# Patient Record
Sex: MALE | Race: WHITE | NOT HISPANIC OR LATINO | URBAN - METROPOLITAN AREA
[De-identification: names, ages, dates, MRNs, and addresses within clinical notes are randomized per-mention and may not be internally consistent; named-entity substitution may affect disease eponyms.]

---

## 2017-03-12 ENCOUNTER — EMERGENCY (EMERGENCY)
Facility: HOSPITAL | Age: 52
LOS: 1 days | Discharge: HOME | End: 2017-03-12
Attending: EMERGENCY MEDICINE | Admitting: EMERGENCY MEDICINE
Payer: COMMERCIAL

## 2017-03-12 VITALS
SYSTOLIC BLOOD PRESSURE: 146 MMHG | HEIGHT: 70 IN | RESPIRATION RATE: 19 BRPM | HEART RATE: 83 BPM | DIASTOLIC BLOOD PRESSURE: 78 MMHG | WEIGHT: 177.91 LBS | TEMPERATURE: 98 F | OXYGEN SATURATION: 98 %

## 2017-03-12 DIAGNOSIS — E78.5 HYPERLIPIDEMIA, UNSPECIFIED: ICD-10-CM

## 2017-03-12 DIAGNOSIS — Z88.8 ALLERGY STATUS TO OTHER DRUGS, MEDICAMENTS AND BIOLOGICAL SUBSTANCES STATUS: ICD-10-CM

## 2017-03-12 DIAGNOSIS — R10.30 LOWER ABDOMINAL PAIN, UNSPECIFIED: ICD-10-CM

## 2017-03-12 DIAGNOSIS — K59.00 CONSTIPATION, UNSPECIFIED: ICD-10-CM

## 2017-03-12 DIAGNOSIS — E78.5 HYPERLIPIDEMIA, UNSPECIFIED: Chronic | ICD-10-CM

## 2017-03-12 LAB
ALBUMIN SERPL ELPH-MCNC: 4.9 G/DL — SIGNIFICANT CHANGE UP (ref 3.3–5)
ALP SERPL-CCNC: 68 U/L — SIGNIFICANT CHANGE UP (ref 40–120)
ALT FLD-CCNC: 24 U/L RC — SIGNIFICANT CHANGE UP (ref 10–45)
ANION GAP SERPL CALC-SCNC: 17 MMOL/L — SIGNIFICANT CHANGE UP (ref 5–17)
AST SERPL-CCNC: 28 U/L — SIGNIFICANT CHANGE UP (ref 10–40)
BASOPHILS # BLD AUTO: 0.1 K/UL — SIGNIFICANT CHANGE UP (ref 0–0.2)
BASOPHILS NFR BLD AUTO: 1.1 % — SIGNIFICANT CHANGE UP (ref 0–2)
BILIRUB SERPL-MCNC: 1 MG/DL — SIGNIFICANT CHANGE UP (ref 0.2–1.2)
BUN SERPL-MCNC: 15 MG/DL — SIGNIFICANT CHANGE UP (ref 7–23)
CALCIUM SERPL-MCNC: 9.9 MG/DL — SIGNIFICANT CHANGE UP (ref 8.4–10.5)
CHLORIDE SERPL-SCNC: 101 MMOL/L — SIGNIFICANT CHANGE UP (ref 96–108)
CO2 SERPL-SCNC: 23 MMOL/L — SIGNIFICANT CHANGE UP (ref 22–31)
CREAT SERPL-MCNC: 0.87 MG/DL — SIGNIFICANT CHANGE UP (ref 0.5–1.3)
EOSINOPHIL # BLD AUTO: 0.1 K/UL — SIGNIFICANT CHANGE UP (ref 0–0.5)
EOSINOPHIL NFR BLD AUTO: 0.8 % — SIGNIFICANT CHANGE UP (ref 0–6)
GLUCOSE SERPL-MCNC: 107 MG/DL — HIGH (ref 70–99)
HCT VFR BLD CALC: 45.7 % — SIGNIFICANT CHANGE UP (ref 39–50)
HGB BLD-MCNC: 14.8 G/DL — SIGNIFICANT CHANGE UP (ref 13–17)
LIDOCAIN IGE QN: 22 U/L — SIGNIFICANT CHANGE UP (ref 7–60)
LYMPHOCYTES # BLD AUTO: 1.9 K/UL — SIGNIFICANT CHANGE UP (ref 1–3.3)
LYMPHOCYTES # BLD AUTO: 28.8 % — SIGNIFICANT CHANGE UP (ref 13–44)
MCHC RBC-ENTMCNC: 30.5 PG — SIGNIFICANT CHANGE UP (ref 27–34)
MCHC RBC-ENTMCNC: 32.5 GM/DL — SIGNIFICANT CHANGE UP (ref 32–36)
MCV RBC AUTO: 94 FL — SIGNIFICANT CHANGE UP (ref 80–100)
MONOCYTES # BLD AUTO: 0.5 K/UL — SIGNIFICANT CHANGE UP (ref 0–0.9)
MONOCYTES NFR BLD AUTO: 7.4 % — SIGNIFICANT CHANGE UP (ref 2–14)
NEUTROPHILS # BLD AUTO: 4.1 K/UL — SIGNIFICANT CHANGE UP (ref 1.8–7.4)
NEUTROPHILS NFR BLD AUTO: 62 % — SIGNIFICANT CHANGE UP (ref 43–77)
PLATELET # BLD AUTO: 255 K/UL — SIGNIFICANT CHANGE UP (ref 150–400)
POTASSIUM SERPL-MCNC: 4.4 MMOL/L — SIGNIFICANT CHANGE UP (ref 3.5–5.3)
POTASSIUM SERPL-SCNC: 4.4 MMOL/L — SIGNIFICANT CHANGE UP (ref 3.5–5.3)
PROT SERPL-MCNC: 7.6 G/DL — SIGNIFICANT CHANGE UP (ref 6–8.3)
RBC # BLD: 4.86 M/UL — SIGNIFICANT CHANGE UP (ref 4.2–5.8)
RBC # FLD: 10.4 % — SIGNIFICANT CHANGE UP (ref 10.3–14.5)
SODIUM SERPL-SCNC: 141 MMOL/L — SIGNIFICANT CHANGE UP (ref 135–145)
WBC # BLD: 6.6 K/UL — SIGNIFICANT CHANGE UP (ref 3.8–10.5)
WBC # FLD AUTO: 6.6 K/UL — SIGNIFICANT CHANGE UP (ref 3.8–10.5)

## 2017-03-12 PROCEDURE — 99283 EMERGENCY DEPT VISIT LOW MDM: CPT | Mod: 25

## 2017-03-12 PROCEDURE — 85027 COMPLETE CBC AUTOMATED: CPT

## 2017-03-12 PROCEDURE — 93010 ELECTROCARDIOGRAM REPORT: CPT | Mod: NC

## 2017-03-12 PROCEDURE — 93005 ELECTROCARDIOGRAM TRACING: CPT

## 2017-03-12 PROCEDURE — 99284 EMERGENCY DEPT VISIT MOD MDM: CPT | Mod: 25

## 2017-03-12 PROCEDURE — 80053 COMPREHEN METABOLIC PANEL: CPT

## 2017-03-12 PROCEDURE — 83690 ASSAY OF LIPASE: CPT

## 2017-03-12 RX ORDER — POLYETHYLENE GLYCOL 3350 17 G/17G
17 POWDER, FOR SOLUTION ORAL ONCE
Qty: 0 | Refills: 0 | Status: COMPLETED | OUTPATIENT
Start: 2017-03-12 | End: 2017-03-12

## 2017-03-12 RX ADMIN — POLYETHYLENE GLYCOL 3350 17 GRAM(S): 17 POWDER, FOR SOLUTION ORAL at 13:44

## 2017-03-12 NOTE — ED ADULT TRIAGE NOTE - CHIEF COMPLAINT QUOTE
constipated, distention feelings s/p eating sandwich last week. pt states he went to urgent care and was given meds which made him nauseous. pt had BM today, states feels relief from discomfort

## 2017-03-12 NOTE — ED ADULT NURSE NOTE - OBJECTIVE STATEMENT
51 y/o M presents to the ED c/o of gas, bloating, constipation and nausea.  Patient states he felt constipated last week, Patient had been taking over the counter stool softens for a week.  Patient went to a first med yesterday and was prescribed Bentyl, but now feels nauseous.  Last BM today and was soft.  No blood in stool.  Patient has mild nausea now.  Patient is A&Ox4. Face is symmetrical. PERRL 3mmB. Speech is clear. No chest pain, shortness of breath, diaphoresis, palpitations.  Abdomen is soft, nondistended, nontender to palpation.

## 2017-03-12 NOTE — ED PROVIDER NOTE - MEDICAL DECISION MAKING DETAILS
benign abdominal exam, no indication to ct right now - lenghty discussion regarding treatment and follow up - agrees to go to GI for colonoscopy this week - has his own GI. po challenge and miralax -marcel

## 2017-03-12 NOTE — ED PROVIDER NOTE - PHYSICAL EXAMINATION
Gen: well appearing, of stated age, no acute distress; Head: NC, AT; ENT: MMM, no uvular deviation; Neck: supple with full ROM; Chest: CTAB, no retractions, rate normal, appears to breath comfortable; Heart: RRR S1S2 No JVD No peripheral edema b/l pulses 2+ in arms and legs; Abd: Soft non-tender, no rebound or guarding, no masses, no brown sign, no mcburney tenderness, no CVAT; Back: No spinal deformity; Ext: Moving all 4 limbs without obvious impairment to ROM, no obvious weakness; Neuro: fluid speech, no focal deficits; Psych: No anxiety, depression or pressured speech noted; Skin: no utricaria, no diffuse rash. -ncohen

## 2017-03-12 NOTE — ED PROVIDER NOTE - ATTENDING CONTRIBUTION TO CARE
I have seen and evaluated this patient with the resident.   I agree with the findings  unless other wise stated.  I have made appropriate changes in documentations where needed, After my face to face bedside evaluation, I am further  noting: benign abdominal exam, no indication to ct right now - lenghty discussion regarding treatment and follow up - agrees to go to GI for colonoscopy this week - has his own GI. po challenge and miralax hydration

## 2017-03-12 NOTE — ED PROVIDER NOTE - OBJECTIVE STATEMENT
52 year old male otherwise healthy presenting with constipation since wednesday. States that he ate an egg sandwhich / keenan sandwhich and had abdominal crapms then and decreased bm since then, only had 2 bm since wednesday, usually daily. No abdominal symptoms now. States that he regurgitated while eating but has had no problem eating since. Never had colonoscopy. No fever, no injury, no tarry stool, no bloody stool - prune juice w bm today.

## 2019-11-18 NOTE — ED ADULT TRIAGE NOTE - AS O2 DELIVERY
Quality 265: Biopsy Follow-Up: Biopsy results reviewed, communicated, tracked, and documented
room air
Quality 358: Patient-Centered Surgical Risk Assessment And Communication: Documentation of patient-specific risk assessment with a risk calculator based on multi-institutional clinical data, the specific risk calculator used, and communication of risk assessment from risk calculator with the patient or family.
Quality 431: Preventive Care And Screening: Unhealthy Alcohol Use - Screening: Patient screened for unhealthy alcohol use using a single question and scores less than 2 times per year
Detail Level: Detailed
Quality 110: Preventive Care And Screening: Influenza Immunization: Influenza Immunization Administered during Influenza season
Quality 226: Preventive Care And Screening: Tobacco Use: Screening And Cessation Intervention: Patient screened for tobacco and never smoked
Quality 400a: One-Time Screening For Hepatitis C Virus (Hcv) For All Patients: Patient received one-time screening for HCV infection

## 2023-08-26 ENCOUNTER — INPATIENT (INPATIENT)
Facility: HOSPITAL | Age: 58
LOS: 2 days | Discharge: HOME | DRG: 66 | End: 2023-08-29
Attending: PSYCHIATRY & NEUROLOGY | Admitting: EMERGENCY MEDICINE
Payer: COMMERCIAL

## 2023-08-26 VITALS
WEIGHT: 164.91 LBS | OXYGEN SATURATION: 96 % | SYSTOLIC BLOOD PRESSURE: 125 MMHG | HEIGHT: 68 IN | RESPIRATION RATE: 22 BRPM | HEART RATE: 70 BPM | DIASTOLIC BLOOD PRESSURE: 76 MMHG | TEMPERATURE: 98 F

## 2023-08-26 LAB
ALBUMIN SERPL ELPH-MCNC: 4.2 G/DL — SIGNIFICANT CHANGE UP (ref 3.3–5)
ALP SERPL-CCNC: 60 U/L — SIGNIFICANT CHANGE UP (ref 40–120)
ALT FLD-CCNC: 18 U/L — SIGNIFICANT CHANGE UP (ref 10–45)
ANION GAP SERPL CALC-SCNC: 13 MMOL/L — SIGNIFICANT CHANGE UP (ref 5–17)
AST SERPL-CCNC: 22 U/L — SIGNIFICANT CHANGE UP (ref 10–40)
BASOPHILS # BLD AUTO: 0.07 K/UL — SIGNIFICANT CHANGE UP (ref 0–0.2)
BASOPHILS NFR BLD AUTO: 0.8 % — SIGNIFICANT CHANGE UP (ref 0–2)
BILIRUB SERPL-MCNC: 0.9 MG/DL — SIGNIFICANT CHANGE UP (ref 0.2–1.2)
BUN SERPL-MCNC: 21 MG/DL — SIGNIFICANT CHANGE UP (ref 7–23)
CALCIUM SERPL-MCNC: 8.9 MG/DL — SIGNIFICANT CHANGE UP (ref 8.4–10.5)
CHLORIDE SERPL-SCNC: 104 MMOL/L — SIGNIFICANT CHANGE UP (ref 96–108)
CO2 SERPL-SCNC: 24 MMOL/L — SIGNIFICANT CHANGE UP (ref 22–31)
CREAT SERPL-MCNC: 0.87 MG/DL — SIGNIFICANT CHANGE UP (ref 0.5–1.3)
EGFR: 99 ML/MIN/1.73M2 — SIGNIFICANT CHANGE UP
EOSINOPHIL # BLD AUTO: 0.04 K/UL — SIGNIFICANT CHANGE UP (ref 0–0.5)
EOSINOPHIL NFR BLD AUTO: 0.4 % — SIGNIFICANT CHANGE UP (ref 0–6)
GLUCOSE SERPL-MCNC: 143 MG/DL — HIGH (ref 70–99)
HCT VFR BLD CALC: 38 % — LOW (ref 39–50)
HGB BLD-MCNC: 12.5 G/DL — LOW (ref 13–17)
IMM GRANULOCYTES NFR BLD AUTO: 0.3 % — SIGNIFICANT CHANGE UP (ref 0–0.9)
LYMPHOCYTES # BLD AUTO: 2.27 K/UL — SIGNIFICANT CHANGE UP (ref 1–3.3)
LYMPHOCYTES # BLD AUTO: 25.2 % — SIGNIFICANT CHANGE UP (ref 13–44)
MAGNESIUM SERPL-MCNC: 2.1 MG/DL — SIGNIFICANT CHANGE UP (ref 1.6–2.6)
MCHC RBC-ENTMCNC: 30.7 PG — SIGNIFICANT CHANGE UP (ref 27–34)
MCHC RBC-ENTMCNC: 32.9 GM/DL — SIGNIFICANT CHANGE UP (ref 32–36)
MCV RBC AUTO: 93.4 FL — SIGNIFICANT CHANGE UP (ref 80–100)
MONOCYTES # BLD AUTO: 0.54 K/UL — SIGNIFICANT CHANGE UP (ref 0–0.9)
MONOCYTES NFR BLD AUTO: 6 % — SIGNIFICANT CHANGE UP (ref 2–14)
NEUTROPHILS # BLD AUTO: 6.07 K/UL — SIGNIFICANT CHANGE UP (ref 1.8–7.4)
NEUTROPHILS NFR BLD AUTO: 67.3 % — SIGNIFICANT CHANGE UP (ref 43–77)
NRBC # BLD: 0 /100 WBCS — SIGNIFICANT CHANGE UP (ref 0–0)
PHOSPHATE SERPL-MCNC: 2.3 MG/DL — LOW (ref 2.5–4.5)
PLATELET # BLD AUTO: 273 K/UL — SIGNIFICANT CHANGE UP (ref 150–400)
POTASSIUM SERPL-MCNC: 3.9 MMOL/L — SIGNIFICANT CHANGE UP (ref 3.5–5.3)
POTASSIUM SERPL-SCNC: 3.9 MMOL/L — SIGNIFICANT CHANGE UP (ref 3.5–5.3)
PROT SERPL-MCNC: 6.4 G/DL — SIGNIFICANT CHANGE UP (ref 6–8.3)
RBC # BLD: 4.07 M/UL — LOW (ref 4.2–5.8)
RBC # FLD: 11.6 % — SIGNIFICANT CHANGE UP (ref 10.3–14.5)
SODIUM SERPL-SCNC: 141 MMOL/L — SIGNIFICANT CHANGE UP (ref 135–145)
WBC # BLD: 9.02 K/UL — SIGNIFICANT CHANGE UP (ref 3.8–10.5)
WBC # FLD AUTO: 9.02 K/UL — SIGNIFICANT CHANGE UP (ref 3.8–10.5)

## 2023-08-26 PROCEDURE — 70450 CT HEAD/BRAIN W/O DYE: CPT | Mod: 26,59,MA

## 2023-08-26 PROCEDURE — 70496 CT ANGIOGRAPHY HEAD: CPT | Mod: 26,MA

## 2023-08-26 PROCEDURE — 99285 EMERGENCY DEPT VISIT HI MDM: CPT

## 2023-08-26 PROCEDURE — 70498 CT ANGIOGRAPHY NECK: CPT | Mod: 26,QQ

## 2023-08-26 RX ORDER — METOCLOPRAMIDE HCL 10 MG
10 TABLET ORAL ONCE
Refills: 0 | Status: COMPLETED | OUTPATIENT
Start: 2023-08-26 | End: 2023-08-26

## 2023-08-26 RX ORDER — MECLIZINE HCL 12.5 MG
50 TABLET ORAL ONCE
Refills: 0 | Status: COMPLETED | OUTPATIENT
Start: 2023-08-26 | End: 2023-08-26

## 2023-08-26 RX ORDER — ONDANSETRON 8 MG/1
4 TABLET, FILM COATED ORAL ONCE
Refills: 0 | Status: COMPLETED | OUTPATIENT
Start: 2023-08-26 | End: 2023-08-26

## 2023-08-26 RX ORDER — SODIUM CHLORIDE 9 MG/ML
2000 INJECTION, SOLUTION INTRAVENOUS ONCE
Refills: 0 | Status: COMPLETED | OUTPATIENT
Start: 2023-08-26 | End: 2023-08-26

## 2023-08-26 RX ADMIN — SODIUM CHLORIDE 2000 MILLILITER(S): 9 INJECTION, SOLUTION INTRAVENOUS at 19:32

## 2023-08-26 RX ADMIN — Medication 50 MILLIGRAM(S): at 20:20

## 2023-08-26 RX ADMIN — Medication 10 MILLIGRAM(S): at 21:21

## 2023-08-26 RX ADMIN — ONDANSETRON 4 MILLIGRAM(S): 8 TABLET, FILM COATED ORAL at 19:30

## 2023-08-26 NOTE — ED ADULT NURSE NOTE - NSFALLOOBATTEMPT_ED_ALL_ED
THE [facility] in [facility] [address] [address]    Phone:  [phone]    Fax:  Jeannine Brandt 24   MRN: [id number]    Department:  THE [facility] in [place]   Date of Visit:  2/23/2017           Diag To Check ER Wait Times:  TEXT 'ERwait' to 22403      Click [email or web address]      Or call [phone]    If you have any problems with your follow-up, please call our  at [phone].     Si usted tiene algun problema con kaminski sequimiento, por I have read and understand the instructions given to me by my caregivers. 24-Hour Pharmacies        Pharmacy Address Phone Number   [facility] [address] [facility] ([address]) [address].  [facility] & CONCLUSION:  Peribronchial wall thickening which may represent a viral etiology versus reactive airways disease           Dictated by: Julieta Boyd MD on 2/23/2017 at 10:11       Approved by: Julieta Boyd MD              Narrative:    PROCEDURE:  XR No

## 2023-08-26 NOTE — ED ADULT NURSE NOTE - NSFALLUNIVINTERV_ED_ALL_ED
Bed/Stretcher in lowest position, wheels locked, appropriate side rails in place/Call bell, personal items and telephone in reach/Instruct patient to call for assistance before getting out of bed/chair/stretcher/Non-slip footwear applied when patient is off stretcher/[place] to call system/Physically safe environment - no spills, clutter or unnecessary equipment/Purposeful proactive rounding/Room/bathroom lighting operational, light cord in reach

## 2023-08-26 NOTE — ED ADULT NURSE NOTE - OBJECTIVE STATEMENT
58y M PMH HLD bibEMS from home c/o vomiting. EMS reports pt was at home and ate a hamburger for dinner when approx 20minutes later became dizzy and started vomiting. No hx of vertigo. Pt appears very uncomfortable, not currently vomiting. Denies cp, fevers, chills, sob, diarrhea, abd pain, urinary symptoms. VS documented. EKG done. MD Estrada at bedside. Comfort and safety maintained.

## 2023-08-26 NOTE — ED PROVIDER NOTE - PROGRESS NOTE DETAILS
Dr. Carr:  Pt slightly improved. No longer nauseated or vomiting, however still quite ataxic. Will give addtl meds and image. Eva PGY3: patient reassessed. Reports symptomatic improvement. However still significantly ataxic. Neurology consult placed. Neurology recommends CDU observation for MRI.

## 2023-08-26 NOTE — ED PROVIDER NOTE - CLINICAL SUMMARY MEDICAL DECISION MAKING FREE TEXT BOX
58M hx HLD here with acute onset dizziness assoc w NV onset this evening. Sx worse w head mvmt, change in position. No HA or vision change, No recent illness, No head trauma. No CP. No weakness, numbness, tingling. Unsteady gait. VS WNL. PERRL EOMI. CN intact. Motor 5/5 throughout, SILT. Ataxic. Suspect BPPV. Less likely central etiology or arrhthymias. Check labs, EKG, meds, re-eval.

## 2023-08-26 NOTE — ED PROVIDER NOTE - OBJECTIVE STATEMENT
58-year-old male past medical history of hyperlipidemia presents the ED complaining of several episodes of nausea and vomiting after sudden onset room spinning dizziness while eating dinner earlier tonight.  Patient states that dizziness is worse when he is moving his head to the left.  Denies history of vertigo, chest pain, shortness of breath, lightheadedness, fever, abdominal pain, dysuria, diarrhea.

## 2023-08-26 NOTE — ED PROVIDER NOTE - PHYSICAL EXAMINATION
GEN: Patient awake alert NAD.   HEENT: normocephalic, atraumatic, EOMI, no nystagmus, no scleral icterus, moist MM  CARDIAC: RRR, S1, S2, no murmur.   PULM: CTA B/L no wheeze, rhonchi, rales.   ABD: soft NT, ND, no rebound no guarding  MSK: Moving all extremities, no edema. 5/5 strength and full ROM in all extremities.     NEURO: A&Ox3, gait ataxia, no focal neurological deficits, CN 2-12 grossly intact, No dysmetria, pronator drift  SKIN: warm, dry, no rash.

## 2023-08-27 DIAGNOSIS — R42 DIZZINESS AND GIDDINESS: ICD-10-CM

## 2023-08-27 LAB
A1C WITH ESTIMATED AVERAGE GLUCOSE RESULT: 5 % — SIGNIFICANT CHANGE UP (ref 4–5.6)
AMPHET UR-MCNC: NEGATIVE — SIGNIFICANT CHANGE UP
BARBITURATES UR SCN-MCNC: NEGATIVE — SIGNIFICANT CHANGE UP
BENZODIAZ UR-MCNC: NEGATIVE — SIGNIFICANT CHANGE UP
CHOLEST SERPL-MCNC: 183 MG/DL — SIGNIFICANT CHANGE UP
COCAINE METAB.OTHER UR-MCNC: NEGATIVE — SIGNIFICANT CHANGE UP
ESTIMATED AVERAGE GLUCOSE: 97 MG/DL — SIGNIFICANT CHANGE UP (ref 68–114)
HDLC SERPL-MCNC: 60 MG/DL — SIGNIFICANT CHANGE UP
LIPID PNL WITH DIRECT LDL SERPL: 115 MG/DL — HIGH
METHADONE UR-MCNC: NEGATIVE — SIGNIFICANT CHANGE UP
NON HDL CHOLESTEROL: 124 MG/DL — SIGNIFICANT CHANGE UP
OPIATES UR-MCNC: NEGATIVE — SIGNIFICANT CHANGE UP
OXYCODONE UR-MCNC: NEGATIVE — SIGNIFICANT CHANGE UP
PCP SPEC-MCNC: SIGNIFICANT CHANGE UP
PCP UR-MCNC: NEGATIVE — SIGNIFICANT CHANGE UP
THC UR QL: NEGATIVE — SIGNIFICANT CHANGE UP
TRIGL SERPL-MCNC: 44 MG/DL — SIGNIFICANT CHANGE UP

## 2023-08-27 PROCEDURE — 99223 1ST HOSP IP/OBS HIGH 75: CPT

## 2023-08-27 PROCEDURE — 70551 MRI BRAIN STEM W/O DYE: CPT | Mod: 26,MA

## 2023-08-27 PROCEDURE — 99236 HOSP IP/OBS SAME DATE HI 85: CPT

## 2023-08-27 RX ORDER — SODIUM,POTASSIUM PHOSPHATES 278-250MG
1 POWDER IN PACKET (EA) ORAL ONCE
Refills: 0 | Status: COMPLETED | OUTPATIENT
Start: 2023-08-27 | End: 2023-08-27

## 2023-08-27 RX ORDER — MECLIZINE HCL 12.5 MG
25 TABLET ORAL EVERY 6 HOURS
Refills: 0 | Status: DISCONTINUED | OUTPATIENT
Start: 2023-08-27 | End: 2023-08-29

## 2023-08-27 RX ORDER — CLOPIDOGREL BISULFATE 75 MG/1
75 TABLET, FILM COATED ORAL DAILY
Refills: 0 | Status: DISCONTINUED | OUTPATIENT
Start: 2023-08-27 | End: 2023-08-29

## 2023-08-27 RX ORDER — ASPIRIN/CALCIUM CARB/MAGNESIUM 324 MG
81 TABLET ORAL DAILY
Refills: 0 | Status: DISCONTINUED | OUTPATIENT
Start: 2023-08-27 | End: 2023-08-29

## 2023-08-27 RX ORDER — ACETAMINOPHEN 500 MG
650 TABLET ORAL EVERY 6 HOURS
Refills: 0 | Status: DISCONTINUED | OUTPATIENT
Start: 2023-08-27 | End: 2023-08-29

## 2023-08-27 RX ORDER — ENOXAPARIN SODIUM 100 MG/ML
40 INJECTION SUBCUTANEOUS EVERY 24 HOURS
Refills: 0 | Status: DISCONTINUED | OUTPATIENT
Start: 2023-08-27 | End: 2023-08-29

## 2023-08-27 RX ORDER — FAMOTIDINE 10 MG/ML
40 INJECTION INTRAVENOUS EVERY 12 HOURS
Refills: 0 | Status: DISCONTINUED | OUTPATIENT
Start: 2023-08-27 | End: 2023-08-29

## 2023-08-27 RX ORDER — SIMVASTATIN 20 MG/1
1 TABLET, FILM COATED ORAL
Qty: 0 | Refills: 0 | DISCHARGE

## 2023-08-27 RX ORDER — ATORVASTATIN CALCIUM 80 MG/1
80 TABLET, FILM COATED ORAL AT BEDTIME
Refills: 0 | Status: DISCONTINUED | OUTPATIENT
Start: 2023-08-27 | End: 2023-08-29

## 2023-08-27 RX ADMIN — Medication 25 MILLIGRAM(S): at 14:32

## 2023-08-27 RX ADMIN — Medication 650 MILLIGRAM(S): at 17:32

## 2023-08-27 RX ADMIN — ENOXAPARIN SODIUM 40 MILLIGRAM(S): 100 INJECTION SUBCUTANEOUS at 15:11

## 2023-08-27 RX ADMIN — Medication 1 TABLET(S): at 15:16

## 2023-08-27 RX ADMIN — CLOPIDOGREL BISULFATE 75 MILLIGRAM(S): 75 TABLET, FILM COATED ORAL at 15:11

## 2023-08-27 RX ADMIN — Medication 81 MILLIGRAM(S): at 15:11

## 2023-08-27 RX ADMIN — Medication 650 MILLIGRAM(S): at 18:25

## 2023-08-27 RX ADMIN — ATORVASTATIN CALCIUM 80 MILLIGRAM(S): 80 TABLET, FILM COATED ORAL at 21:16

## 2023-08-27 NOTE — ED CDU PROVIDER DISPOSITION NOTE - NSFOLLOWUPCLINICS_GEN_ALL_ED_FT
[facility]  Neurology Auto[facility] En[facility]itis  [address] [place] [address]  Phone: [phone]  Fax: [phone]

## 2023-08-27 NOTE — CONSULT NOTE ADULT - ASSESSMENT
Impression: Vertigo likely peripheral (BPPV). However, given history LUE dysmetria, vascular comorbidities, and pt still having difficulty walking (no walking issues at baseline), would be prudent to r/o ischemic stroke of brainstem/cerebellum that could cause vertigo.    Recommendations:  [ ] CDU for MRI w/o contrast to evaluate for ischemic stroke. Further recs pending results.  [ ] Meclizine for symptom control    Case discussed with neuro attending Dr. Gerardo.  JIMW 5 PM 8/26/2023  NIHSS 1  preMRS 0  Pt not IV tenecteplase candidate because outside of time window  Pt not thrombectomy candidate because no LVO    Impression: Vertigo likely peripheral (BPPV). However, given history LUE dysmetria, vascular comorbidities, and pt still having difficulty walking (no walking issues at baseline), would be prudent to r/o ischemic stroke of brainstem/cerebellum that could cause vertigo.    Recommendations:  [ ] CDU for MRI w/o contrast to evaluate for ischemic stroke. Further recs pending results.  [ ] Meclizine for symptom control    Case discussed with neuro attending Dr. Gerardo.

## 2023-08-27 NOTE — CONSULT NOTE ADULT - ATTENDING COMMENTS
HPI outlined above.   Patient with acute onset vertigo yesterday while watching TV associated with episode of vomiting. Positional changes trigger vertigo but has new gait unsteadiness. Denies bulbar symptoms or focal motor weakness.     On exam: Alert, follows commands well. No aphasia  EOMI, VFF V1-V3 intact. Face symmetric  Strength 5/5 throughout  Sensation: intact LT b/l  DTRs 2+ b/l  Passpoints on Left FNF and has Left HTS ataxia  Stands with wide base of station and is unsteady when taking a step. Cannot tandem    CTH: no acute pathology    Imp: acute onset vertigo with evidence of left hemiataxia; r/o stroke  - Needs MRI brain  - will f/u after

## 2023-08-27 NOTE — H&P ADULT - NSHPLABSRESULTS_GEN_ALL_CORE
12.5   9.02  )-----------( 273      ( 26 Aug 2023 19:30 )             38.0     141  |  104  |  21  ----------------------------<  143<H>  3.9   |  24  |  0.87  Ca    8.9      26 Aug 2023 19:30  Phos  2.3     08-26  Mg     2.1     08-26  TPro  6.4  /  Alb  4.2  /  TBili  0.9  /  DBili  x   /  AST  22  /  ALT  18  /  AlkPhos  60  08-26  Urinalysis Basic - ( 26 Aug 2023 19:30 )  Color: x / Appearance: x / SG: x / pH: x  Gluc: 143 mg/dL / Ketone: x  / Bili: x / Urobili: x   Blood: x / Protein: x / Nitrite: x   Leuk Esterase: x / RBC: x / WBC x   Sq Epi: x / Non Sq Epi: x / Bacteria: x  CAPILLARY BLOOD GLUCOSE      < from: CT Angio Head w/ IV Cont (08.26.23 @ 23:27) >    IMPRESSION:    CT HEAD: Negative noncontrast head CT.    CTA HEAD:  No flow-limiting stenosis or occlusion. 2 mm anterior communicating   artery aneurysm.    CTA NECK:  No flow-limiting stenosis, occlusion or dissection.    < end of copied text >    < from: MR Head No Cont (08.27.23 @ 11:22) >      Comparison is made with the prior CT/CTA of 8/26/2023.    The fourth, third and lateral ventriclesare normal in size and position.   There is no hemorrhage, mass or shift of the midline structures. There is   an acute infarct in the left cerebellar hemisphere in between the border   zone of the left posterior inferior and anterior inferior cerebellar   arteries. No acute hemorrhage is identified.      The sellar and parasellar structures are unremarkable. The paranasal   sinuses are clear.    IMPRESSION: Acute left cerebellar infarct.    < end of copied text >

## 2023-08-27 NOTE — ED CDU PROVIDER INITIAL DAY NOTE - OBJECTIVE STATEMENT
58-year-old male past medical history of hyperlipidemia presents the ED complaining of several episodes of nausea and vomiting after sudden onset room spinning dizziness while eating dinner earlier tonight.  Patient states that dizziness is worse when he is moving his head to the left.  Denies history of vertigo, chest pain, shortness of breath, lightheadedness, fever, abdominal pain, dysuria, diarrhea.  In ED, labs nonactionable, no infarct or ischemic dz on CT/CTA head and neck, pt persistently dizzy - neuro consulted rec mri brain w/o. CDU for mri and neuro following when CDU bed becomes available.

## 2023-08-27 NOTE — H&P ADULT - ASSESSMENT
note incomplete    admit to stroke unit 58M HLD, p/w acute onset of vertigo with nausea/vomiting and gait instability. CTH neg. CTA with 2mm acom aneurysm. MR brain with cute infarct in the left cerebellar hemisphere in between the border zone of the left posterior inferior and anterior inferior cerebellar arteries.    Impression: Vertigo, dysmetria, ataxia due to cerebellar dysfunction due to acute ischemic stroke in cerebellum likely due to small vessel disease    Last known normal 1600 8/26/23,  NIHSS: 1 (dysmetria) with atypical symptoms (vertigo, nausea)  preMRS: 0  Patient is not a Tenecteplase candidate due to neurological encounter/exam outside of appropriate time window.   Patient not a thromectomy candidate due to ac of large vessel occlusion on CTA.    Plan:  Stroke acute management:  [] Frequent neuro-checks q4h and VS q4h; STAT CTH for change in neuro exam  [] Permissive HTN up to 220/110 for 24-48h from symptom onset followed by gradual normotension over 2-3 days   [] NPO unless passes dysphagia screen; swallow eval if fails  [] DVT ppx: lovenox sq daily +  SCDs    Secondary prevention of stroke:  []Aspirin 81mg PO daily (325 per rectum if fails dysphagia)   [] Plavix 75 mg PO daily for 3 weeks per CHANCE trial  []Atorvastatin 80 mg PO daily (long-term goal LDL < 70) (current  on simvastatin 40)  [X]Tight glucose control (long-term goal HgbA1c < 6%)  []Rehabilitation: PT/OT/S+S/SW/CM consults    Stroke workup:  [X]MRI brain w/o contrast - Acute cerebellar stroke LEFT  []TTE w/ bubble study  []Telemetry to monitor for arrhythmia; Consider implantable loop recorder depending on clinical course  []Check HgbA1C (5.0), fasting lipid panel (), utox, lupus panel  []Consider further hypercoagulable worup / cancer rule out if no clear etiology detected       Case discussed with stroke fellow Dr. Bryant Lux under supervision of Dr. Sara Thompson to be staffed tomorrow morning with Dr. Baljinder Rodríguez     58M HLD, p/w acute onset of vertigo with nausea/vomiting and gait instability. CTH neg. CTA with 2mm acom aneurysm. MR brain with cute infarct in the left cerebellar hemisphere in between the border zone of the left posterior inferior and anterior inferior cerebellar arteries.    Impression: Vertigo, dysmetria, ataxia due to cerebellar dysfunction due to acute ischemic stroke in cerebellum likely due to small vessel disease    Last known normal 1600 8/26/23,  NIHSS: 1 (dysmetria) with atypical symptoms (vertigo, nausea)  preMRS: 0  Patient is not a Tenecteplase candidate due to neurological encounter/exam outside of appropriate time window.   Patient not a thrombectomy candidate due to absence of large vessel occlusion on CTA.    Plan:  Stroke acute management:  [] Frequent neuro-checks q4h and VS q4h; STAT CTH for change in neuro exam  [] Permissive HTN up to 220/110 for 24-48h from symptom onset followed by gradual normotension over 2-3 days   [] NPO unless passes dysphagia screen; swallow eval if fails  [] DVT ppx: lovenox sq daily +  SCDs    Secondary prevention of stroke:  []Aspirin 81mg PO daily (325 per rectum if fails dysphagia)   [] Plavix 75 mg PO daily for 3 weeks per CHANCE trial  []Atorvastatin 80 mg PO daily (long-term goal LDL < 70) (current  on simvastatin 40)  [X]Tight glucose control (long-term goal HgbA1c < 6%)  []Rehabilitation: PT/OT/S+S/SW/CM consults    Stroke workup:  [X]MRI brain w/o contrast - Acute cerebellar stroke LEFT  []TTE w/ bubble study  []Telemetry to monitor for arrhythmia; Consider implantable loop recorder depending on clinical course  []Check HgbA1C (5.0), fasting lipid panel (), utox, lupus panel  []Consider further hypercoagulable worup / cancer rule out if no clear etiology detected     Other:  []Will need interval monitoring for likely incidental small acomm aneurysm on an outpatient basis   []Meclizine PRN  []Pepcid PRN    Case discussed with stroke fellow Dr. Bryant Lux under supervision of Dr. Sara Thompson to be staffed tomorrow morning with Dr. Baljinder Rodríguez

## 2023-08-27 NOTE — H&P ADULT - ATTENDING COMMENTS
Mr. Riley is a 58-year-old man with past medical history of hyperlipidemia.  He presented with sudden onset of vertigo associated with nausea and vomiting vertigo was associated with transient tingling and numbness of bilateral upper extremities.  He also notices equilibrium is off and felt as if he was on a boat. He had blurry vision but no double vision.  He has no significant past medical history of atherosclerotic disease, non-smoker no cardiac events.  Today on neurological exam he is awake, alert feels he is back to baseline, no significant dysmetria, symmetric sensation bilateral upper and lower extremity, no motor drift.  Gait exam was deferred.  Neuroimaging: Acute infarction in the left cerebellar hemisphere in PICA/AICA territory  No vascular flow-limiting stenosis of vertebral or basilar artery.  2.Incidental 2 mm ACOM aneurysm noted on CTA.    Impression:   Left cerebellar ischemic infarcts, unclear etiology, work-up in progress  Patient is refusing transesophageal echocardiogram loop recorder and essentially any tests that are invasive and require puncture etc. He is okay with proceeding with 2D echocardiogram.  Patient understands the risk of not undergoing a thorough work-up of stroke in young.  I have explained to him risk of recurrent stroke in the absence of undergoing complete work-up.  We will continue dual antiplatelet therapy for 3 weeks followed by aspirin 81 mg only  Statins  PT OT

## 2023-08-27 NOTE — ED CDU PROVIDER DISPOSITION NOTE - ATTENDING APP SHARED VISIT CONTRIBUTION OF CARE
Attending MD PINEDA Capps I have personally performed a face to face diagnostic evaluation on this patient.  I have reviewed the ACP note and agree with the history, exam, and plan of care, except as noted. My exam and MDM are as follows:    0800–received signout pending MRI and neurology recommendations.     In short, this is a 58 M with PMH HLD presented to ED for onset of room spinning sensation and disequilibrium associated with nausea and vomiting.  ED work-up including CT CTA were notable for 2 mm anterior communicating artery aneurysm, otherwise no acute infarction, hemorrhage, stenosis.  Seen by neurology recommended MRI.  Currently pending MRI neurology recommendation.    No overnight events.  On my assessment, patient endorsing improvement in symptoms while at rest, but continued dizziness and feeling off balance while ambulating.  No headache, change in hearing or vision, focal weakness or paresthesia.  Patient in no acute distress, grossly neurologically intact.    MRI concerning for acute left-sided cerebellar infarct, neurology recommending admission

## 2023-08-27 NOTE — ED CDU PROVIDER DISPOSITION NOTE - NSFOLLOWUPINSTRUCTIONS_ED_ALL_ED_FT
1) Follow-up with your primary care provider within 2-3 days. Additionally follow-up Vestibular Rehab within 1 week, you have been provided with contact information.    [facility]  A program of [facility]   [phone]  Fax: [phone] [address]  [place] [id number]    2) Take Meclizine 25mg every 6 hours as needed for vertigo symptoms.     3) Continue to take all other medications as directed.    4) Return to the emergency room immediately if your symptoms worsen or persist, or if you have fever, headache, change in vision, numbness, tingling, weakness, difficulty speaking, walking, swallowing, or if any other concerning or questionable symptoms. 1) Follow-up with your primary care provider within 2-3 days. Additionally follow-up Neurology and Vestibular Rehab within 1 week, you have been provided with contact information.    [facility]  [address] [place] [address]  Phone: [phone]  Fax: [phone]    Your CT scan showed a small 2 mm anterior communicating artery aneurysm. This is likely preexisting and unlikely the cause of your dizziness. Please discusses this with your outpatient providers as it may require monitoring in the future.    \[facility]\"" Rehabilitation  A program of [facility]   [phone]  Fax: [phone] [address] [facility], [address] [place] [address]    2) Take Meclizine 25mg every 6 hours as needed for vertigo symptoms.     3) Continue to take all other medications as directed.    4) Return to the emergency room immediately if your symptoms worsen or persist, or if you have fever, headache, change in vision, numbness, tingling, weakness, difficulty speaking, walking, swallowing, or if any other concerning or questionable symptoms.

## 2023-08-27 NOTE — ED CDU PROVIDER INITIAL DAY NOTE - CLINICAL SUMMARY MEDICAL DECISION MAKING FREE TEXT BOX
I was the supervising attending. I have independently seen face-to-face and examined the patient. I have reviewed the history and physical and discussed the MDM with the resident, fellow, EBEN and/or student. I agree with the assessment and plan as presented.

## 2023-08-27 NOTE — ED ADULT NURSE REASSESSMENT NOTE - NS ED NURSE REASSESS COMMENT FT1
Assessed pt at 1230 Pt awake alert and orientedx4 Denies any discomfort. GCS 15. AT 1445 pt c/o dizziness Amb with steady gait. Meclizine given as well as PEPCID Alert and orientedx4 GCS 15 Denies N/V Responds approp to verbal and tactile stimuli BEFAST neg
Pt ambulated to bathroom w/ tech using standby assist. Pt able to ambulate on own.
Pt resting in bed, VSS, NAD. Pt verbalizing improvement in dizziness and would like to ambulate. To be moved to CDU.
Transferred to floor.
pt reports improvement in dizziness, however, not 100%. pt to be transferred to obs when bed becomes available.

## 2023-08-27 NOTE — H&P ADULT - NSHPREVIEWOFSYSTEMS_GEN_ALL_CORE
REVIEW OF SYSTEMS:  CONSTITUTIONAL: No weakness, fevers or chills  EYES/ENT: No visual changes except transient binocular blurry vision;  No vertigo or throat pain   NECK: No pain or stiffness  RESPIRATORY: No cough, wheezing, hemoptysis; No shortness of breath  CARDIOVASCULAR: No chest pain or palpitations  GASTROINTESTINAL: +abd discomfort +nausea +vomiting   No diarrhea or constipation. No melena or hematochezia.  GENITOURINARY: No dysuria, frequency or hematuria  NEUROLOGICAL: No numbness or weakness  SKIN: No itching, burning, rashes, or lesions   HEME: no easy bruising or unexplained bleeding  ENDO: no heat intolerance, no cold intolerance  PSYCH: no SI, or depression  All other review of systems is negative unless indicated above.

## 2023-08-27 NOTE — CONSULT NOTE ADULT - SUBJECTIVE AND OBJECTIVE BOX
Neurology - Consult Note    -  Spectra: [phone] ([facility]), [phone] ([facility])  -    HPI: Patient MIGUEL GUTIERREZ is a 58M pmhx HTN, HLD, who p/w acute onset of vertigo with nausea/vomiting and gait instability. LKW 5 PM on 8/26/2023. Pt was sitting down and flexed head down, after which pt had acute onset of vertigo, accompanied by n/v and gait instability. At baseline pt has no issues ambulating. Pt also reported transient numbness/tingling in b/l hands that resolved after a few hours. Pt has never had vertigo before. Pt denies weakness, blurry vision, double vision, hearing loss, or tinnitus. No recent infections. No AC/AP.     Allergies:  simvastatin (Hives)      PMHx/PSHx/Family Hx: As above, otherwise see below   No pertinent past medical history        Social Hx:  No current use of tobacco, alcohol, or illicit drugs  Lives with ***    Medications:  MEDICATIONS  (STANDING):    MEDICATIONS  (PRN):      Vitals:  T(C): 36.7 (08-27-23 @ 00:48), Max: 36.7 (08-27-23 @ 00:48)  HR: 60 (08-27-23 @ 00:48) (59 - 70)  BP: 120/70 (08-27-23 @ 00:48) (113/71 - 125/76)  RR: 16 (08-27-23 @ 00:48) (16 - 22)  SpO2: 98% (08-27-23 @ 00:48) (95% - 98%)    Physical Examination:   General - NAD  Cardiovascular - no edema  Eyes - Fundoscopy not well visualized    Neurologic Exam:  Mental status - Awake, Alert, Oriented to person, place, and time. Speech fluent, repetition and naming intact. Follows simple and complex commands. Attention/concentration, recent and remote memory (including registration and recall), and fund of knowledge intact    Cranial nerves - PERRLA, VFF, EOMI with nonfatigable nystagmus with right gaze, no skew deviation, +[place] Hallpike with head turned 45 degrees left, face sensation (V1-V3) intact b/l, facial strength intact without asymmetry b/l, hearing intact b/l, palate with symmetric elevation, trapezius OR sternocleidomastoid 5/5 strength b/l, tongue midline on protrusion with full lateral movement    Motor - Normal bulk and tone throughout. No pronator drift.    Strength testing            Deltoid      Biceps      Triceps     Wrist Extension    Wrist Flexion     Interossei         R            5                 5               5                     5                              5                        5                 5  L             5                 5               5                     5                              5                        5                 5              Hip Flexion    Hip Extension    Knee Flexion    Knee Extension    Dorsiflexion    Plantar Flexion  R              5                           5                       5                           5                            5                          5  L              5                           5                        5                           5                            5                          5    Sensation - Light touch/temperature intact throughout    DTR's -             Biceps      Triceps     Brachioradialis      Patellar    Ankle    Toes/plantar response  R             2+             2+                  2+                       2+            2+           Mute  L              2+             2+                 2+                        2+           2+            Mute    Coordination - +LUE dysmetria on FTN b/l. No dysmetria on HTS b/l    Gait and station - With small steps pt can stabilize gait, however pt struggles with turns and cannot perform tandem gait    Labs:                        12.5   9.02  )-----------( 273      ( 26 Aug 2023 19:30 )             38.0     08-26    141  |  104  |  21  ----------------------------<  143<H>  3.9   |  24  |  0.87    Ca    8.9      26 Aug 2023 19:30  Phos  2.3     08-26  Mg     2.1     08-26    TPro  6.4  /  Alb  4.2  /  TBili  0.9  /  DBili  x   /  AST  22  /  ALT  18  /  AlkPhos  60  08-26    CAPILLARY BLOOD GLUCOSE        LIVER FUNCTIONS - ( 26 Aug 2023 19:30 )  Alb: 4.2 g/dL / Pro: 6.4 g/dL / ALK PHOS: 60 U/L / ALT: 18 U/L / AST: 22 U/L / GGT: x               CSF:                  Radiology:  CT Head No Cont:  (26 Aug 2023 23:27)     Neurology - Consult Note    -  Spectra: [phone] ([facility]), [phone] ([facility])  -    HPI: Patient MIGUEL GUTIERREZ is a 58M pmhx HTN, HLD, who p/w acute onset of vertigo with nausea/vomiting and gait instability. LKW 5 PM on 8/26/2023. Pt was sitting down and flexed head down, after which pt had acute onset of vertigo, accompanied by n/v and gait instability. At baseline pt has no issues ambulating. Pt also reported transient numbness/tingling in b/l hands that resolved after a few hours. Pt has never had vertigo before. Pt denies weakness, blurry vision, double vision, hearing loss, or tinnitus. No recent infections. No AC/AP. Pt reports symptoms have improved compared to onset, but are still present (went from 10/10 --> 7/10) - of note, pt has received Meclizine 50, Reglan 10, and Zofran 4 in the ED thus far. Pt reports abdominal pain accompanying his other symptoms, but denies fevers, chills, chest pain, dyspnea, palpitations, c/d, or urinary symptoms.    Allergies:  simvastatin (Hives)      PMHx/PSHx/Family Hx: As above, otherwise see below   No pertinent past medical history        Social Hx:  No current use of tobacco, alcohol, or illicit drugs  Lives with ***    Medications:  MEDICATIONS  (STANDING):    MEDICATIONS  (PRN):      Vitals:  T(C): 36.7 (08-27-23 @ 00:48), Max: 36.7 (08-27-23 @ 00:48)  HR: 60 (08-27-23 @ 00:48) (59 - 70)  BP: 120/70 (08-27-23 @ 00:48) (113/71 - 125/76)  RR: 16 (08-27-23 @ 00:48) (16 - 22)  SpO2: 98% (08-27-23 @ 00:48) (95% - 98%)    Physical Examination:   General - NAD  Cardiovascular - no edema  Eyes - Fundoscopy not well visualized    Neurologic Exam:  Mental status - Awake, Alert, Oriented to person, place, and time. Speech fluent, repetition and naming intact. Follows simple and complex commands. Attention/concentration, recent and remote memory (including registration and recall), and fund of knowledge intact    Cranial nerves - PERRLA, VFF, EOMI with nonfatigable nystagmus with right gaze, no skew deviation, +[place] Hallpike with head turned 45 degrees left, face sensation (V1-V3) intact b/l, facial strength intact without asymmetry b/l, hearing intact b/l, palate with symmetric elevation, trapezius OR sternocleidomastoid 5/5 strength b/l, tongue midline on protrusion with full lateral movement    Motor - Normal bulk and tone throughout. No pronator drift.    Strength testing            Deltoid      Biceps      Triceps     Wrist Extension    Wrist Flexion     Interossei         R            5                 5               5                     5                              5                        5                 5  L             5                 5               5                     5                              5                        5                 5              Hip Flexion    Hip Extension    Knee Flexion    Knee Extension    Dorsiflexion    Plantar Flexion  R              5                           5                       5                           5                            5                          5  L              5                           5                        5                           5                            5                          5    Sensation - Light touch/temperature intact throughout    DTR's -             Biceps      Triceps     Brachioradialis      Patellar    Ankle    Toes/plantar response  R             2+             2+                  2+                       2+            2+           Mute  L              2+             2+                 2+                        2+           2+            Mute    Coordination - +LUE dysmetria on FTN b/l. No dysmetria on HTS b/l    Gait and station - With small steps pt can stabilize gait, however pt struggles with turns and cannot perform tandem gait    Labs:                        12.5   9.02  )-----------( 273      ( 26 Aug 2023 19:30 )             38.0     08-26    141  |  104  |  21  ----------------------------<  143<H>  3.9   |  24  |  0.87    Ca    8.9      26 Aug 2023 19:30  Phos  2.3     08-26  Mg     2.1     08-26    TPro  6.4  /  Alb  4.2  /  TBili  0.9  /  DBili  x   /  AST  22  /  ALT  18  /  AlkPhos  60  08-26    CAPILLARY BLOOD GLUCOSE        LIVER FUNCTIONS - ( 26 Aug 2023 19:30 )  Alb: 4.2 g/dL / Pro: 6.4 g/dL / ALK PHOS: 60 U/L / ALT: 18 U/L / AST: 22 U/L / GGT: x               CSF:                  Radiology:  CT Head No Cont:  (26 Aug 2023 23:27)     Neurology - Consult Note    -  Spectra: [phone] ([facility]), [phone] ([facility])  -    HPI: Patient MIGUEL GUTIERREZ is a 58M pmhx HTN, HLD, who p/w acute onset of vertigo with nausea/vomiting and gait instability. LKW 5 PM on 8/26/2023. Pt was sitting down and flexed head down, after which pt had acute onset of vertigo, accompanied by n/v and gait instability. At baseline pt has no issues ambulating. Pt also reported transient numbness/tingling in b/l hands that resolved after a few hours. Pt has never had vertigo before. Pt denies weakness, blurry vision, double vision, hearing loss, or tinnitus. No recent infections. No AC/AP. Pt reports symptoms have improved compared to onset, but are still present (went from 10/10 --> 7/10) - of note, pt has received Meclizine 50, Reglan 10, and Zofran 4 in the ED thus far. Pt reports abdominal pain accompanying his other symptoms, but denies fevers, chills, chest pain, dyspnea, palpitations, c/d, or urinary symptoms.    Allergies:  simvastatin (Hives)      PMHx/PSHx/Family Hx: As above, otherwise see below   No pertinent past medical history        Social Hx:  No current use of tobacco, alcohol, or illicit drugs  Lives with ***    Medications:  MEDICATIONS  (STANDING):    MEDICATIONS  (PRN):      Vitals:  T(C): 36.7 (08-27-23 @ 00:48), Max: 36.7 (08-27-23 @ 00:48)  HR: 60 (08-27-23 @ 00:48) (59 - 70)  BP: 120/70 (08-27-23 @ 00:48) (113/71 - 125/76)  RR: 16 (08-27-23 @ 00:48) (16 - 22)  SpO2: 98% (08-27-23 @ 00:48) (95% - 98%)    Physical Examination:   General - NAD  Cardiovascular - no edema  Eyes - +b/l Arcus Senilis, Fundoscopy not well visualized    Neurologic Exam:  Mental status - Awake, Alert, Oriented to person, place, and time. Speech fluent, repetition and naming intact. Follows simple and complex commands. Attention/concentration, recent and remote memory (including registration and recall), and fund of knowledge intact    Cranial nerves - PERRLA, VFF, EOMI with nonfatigable nystagmus with right gaze, no skew deviation, +[place] Hallpike with head turned 45 degrees left, face sensation (V1-V3) intact b/l, facial strength intact without asymmetry b/l, hearing intact b/l, palate with symmetric elevation, trapezius OR sternocleidomastoid 5/5 strength b/l, tongue midline on protrusion with full lateral movement    Motor - Normal bulk and tone throughout. No pronator drift.    Strength testing            Deltoid      Biceps      Triceps     Wrist Extension    Wrist Flexion     Interossei         R            5                 5               5                     5                              5                        5                 5  L             5                 5               5                     5                              5                        5                 5              Hip Flexion    Hip Extension    Knee Flexion    Knee Extension    Dorsiflexion    Plantar Flexion  R              5                           5                       5                           5                            5                          5  L              5                           5                        5                           5                            5                          5    Sensation - Light touch/temperature intact throughout    DTR's -             Biceps      Triceps     Brachioradialis      Patellar    Ankle    Toes/plantar response  R             2+             2+                  2+                       2+            2+           Mute  L              2+             2+                 2+                        2+           2+            Mute    Coordination - +LUE dysmetria on FTN b/l. No dysmetria on HTS b/l    Gait and station - With small steps pt can stabilize gait, however pt struggles with turns and cannot perform tandem gait    Labs:                        12.5   9.02  )-----------( 273      ( 26 Aug 2023 19:30 )             38.0     08-26    141  |  104  |  21  ----------------------------<  143<H>  3.9   |  24  |  0.87    Ca    8.9      26 Aug 2023 19:30  Phos  2.3     08-26  Mg     2.1     08-26    TPro  6.4  /  Alb  4.2  /  TBili  0.9  /  DBili  x   /  AST  22  /  ALT  18  /  AlkPhos  60  08-26    CAPILLARY BLOOD GLUCOSE        LIVER FUNCTIONS - ( 26 Aug 2023 19:30 )  Alb: 4.2 g/dL / Pro: 6.4 g/dL / ALK PHOS: 60 U/L / ALT: 18 U/L / AST: 22 U/L / GGT: x               CSF:                  Radiology:  CT Head No Cont:  (26 Aug 2023 23:27)

## 2023-08-27 NOTE — PATIENT PROFILE ADULT - FALL HARM RISK - RISK INTERVENTIONS

## 2023-08-27 NOTE — H&P ADULT - NSHPPHYSICALEXAM_GEN_ALL_CORE
Constitutional: well-developed, well-nourished, well-groomed  Eyes: ophthalmoscopic exam deferred secondary to COVID-19 pandemic. b/l arcus senilis  Cardiovascular: no swelling, warm-to-touch    Neurological Examination:    - Mental Status: Alert, awake, oriented to person, age, place, and time; speech is fluent with intact naming, repetition, and follows 1-step and 3-step mid-line crossing commands; good overall fund of knowledge (aware of current events, relevant past history, and vocabulary appropriate for level of education);     - Cranial Nerves: visual fields are full to confrontation; pupils are equal, round, and reactive to light; extraocular movements are intact with bilateral end gaze correctioanl nystagmus; facial sensation is intact in the V1-V3 distribution bilaterally; face is symmetric with normal eye closure and smile; hearing is intact to conversation; uvula is midline and soft palate rises symmetrically; shoulder shrug intact; tongue protrudes in the midline.    - Pt denied vertigo at onset of exam, after gait exam pt endorsed vertigo and HINTS exam performed.  HI: prolonged slow phase then normal corrective saccade bilateraly  N: Correctional nystagmus on b/l end gaze  S: no skew appreciated     - Motor/Strength Testing:                                 Right           Left  Deltoid                     5                 5  Biceps                      5                 5  Triceps                     5                 5  Wrist Ext (radial)       5                 5  Hand                  5                 5  Hip Flex                   5                  5  Knee Ext	      5                  5  Dorsiflex                  5                  5  Plantarflex               5                  5  - There is no pronator drift. Normal muscle bulk and tone throughout.  - Reflexes:   Bicep (C5/C6):                  R 2+ --- L 2+   Brachioradialis (C5/C6) :   R 2+ --- L 2+   Patella (L3/L4) :                 R 2+ --- L 2+   Ankle (S1) :                       R 2+ --- L 2+   - Plant responses mute bilaterally.  - Sensory: Intact throughout to light touch.   - Coordination: Finger-nose-finger intact and Heel-shin without dysmetria on RIGHT. on the LEFT there is very slight dysmetria to both FNF and HS. In comparison with prior documentation and verbal sign out from overnight team, this represents an improvement. The patient also notes significant improvement on these tests and his gait.  - Gait: Short steps, ataxic gait. Able to toe, heel, and tandem walk. Romberg negative

## 2023-08-27 NOTE — H&P ADULT - HISTORY OF PRESENT ILLNESS
HPI: Patient MIGUEL GUTIERREZ is a 58M pmhx HTN, HLD, who p/w acute onset of vertigo with nausea/vomiting and gait instability. LKW 5 PM on 8/26/2023. Pt was sitting down and flexed head down, after which pt had acute onset of vertigo, accompanied by n/v and gait instability. At baseline pt has no issues ambulating. Pt also reported transient numbness/tingling in b/l hands that resolved after a few hours. Pt has never had vertigo before. Pt denies weakness, blurry vision, double vision, hearing loss, or tinnitus. No recent infections. No AC/AP. Pt reports symptoms have improved compared to onset, but are still present (went from 10/10 --> 7/10) - of note, pt has received Meclizine 50, Reglan 10, and Zofran 4 in the ED thus far. Pt reports abdominal pain accompanying his other symptoms, but denies fevers, chills, chest pain, dyspnea, palpitations, c/d, or urinary symptoms.    note incomplete  HPI: Patient MIGUEL GUTIERREZ is a 58M pmhx  HLD, who p/w acute onset of vertigo with nausea/vomiting and gait instability. LKW 1600 on 8/26/2023. Pt was sitting down watching TV when he had acute onset sensation of room spinning which was mildly palliated by putting his head down. He had accompanying nausea and vomiting and gait imbalance,. At baseline pt has no issues ambulating. Pt also reported transient numbness/tingling in b/l finger tips (x10 digits) that resolved after a few hours. He noted brief binocular blurry vision without diplopia  which resolved soon after onset. Reports he briefly had a raspy voice last evening which has since resolved completely. Pt reports abdominal discomfort accompanying his other symptoms. He reports he is up to date on cancer screening with reportedly normal clonoscopy, EGD as well as normal stress test, ecg, and echo,    Pt has never had vertigo before.  Pt denies weakness, sensory changes, double vision, hearing loss, or tinnitus.No AC/AP. Pt reports symptoms have improved since onset and continued to improve while in hospital. Denies recent infection, fevers, chills, cough, sob, abd pain, constipation, diarrhea, dysuria.    ED course: VSS afebrile, normal rate, -140/70s/80s. satting well on room air. Meclizine 50, Reglan 10, and Zofran 4, Kphos    note previous documentation of simvastatin allergy (hives) however pt denies htis and has been taking simvastatin 40mg daily for HLD without issue.

## 2023-08-27 NOTE — ED CDU PROVIDER INITIAL DAY NOTE - NS ED ATTENDING STATEMENT MOD
This was a shared visit with the EBEN. I reviewed and verified the documentation and independently performed the documented:

## 2023-08-27 NOTE — ED CDU PROVIDER DISPOSITION NOTE - CLINICAL COURSE
58-year-old male past medical history of hyperlipidemia presents the ED complaining of several episodes of nausea and vomiting after sudden onset room spinning dizziness while eating dinner earlier tonight.  Patient states that dizziness is worse when he is moving his head to the left.  Denies history of vertigo, chest pain, shortness of breath, lightheadedness, fever, abdominal pain, dysuria, diarrhea.  In ED, labs nonactionable, no infarct or ischemic dz on CT/CTA head and neck, pt persistently dizzy - neuro consulted rec mri brain w/o. CDU for mri and neuro following when CDU bed becomes available.  In CDU, 58-year-old male past medical history of hyperlipidemia presents the ED complaining of several episodes of nausea and vomiting after sudden onset room spinning dizziness while eating dinner earlier tonight.  Patient states that dizziness is worse when he is moving his head to the left.  Denies history of vertigo, chest pain, shortness of breath, lightheadedness, fever, abdominal pain, dysuria, diarrhea.  In ED, labs nonactionable, no infarct or ischemic dz on CT/CTA head and neck, pt persistently dizzy - neuro consulted rec mri brain w/o. CDU for mri and neuro following when CDU bed becomes available.  In CDU, MRI done. showing Acute left cerebellar infarct. spoke with neurology. will admit patient to stroke unit. discussed with Dr. Prasad.

## 2023-08-27 NOTE — ED CDU PROVIDER INITIAL DAY NOTE - PROGRESS NOTE DETAILS
patient seen and evaluated at bedside this morning. offers no complaints. resting comfortably. pending MRI. discussed with Dr. Prasad. MR showing  Acute left cerebellar infarct. spoke with neurology. patient to be admitted to the stroke unit. discussed with Dr. Prasad.

## 2023-08-27 NOTE — ED CDU PROVIDER INITIAL DAY NOTE - PHYSICAL EXAMINATION
GEN: Pt non-toxic in NAD, alert.  PSYCH: Affect and mood appropriate.  EYES: Sclera white w/o injection, EOMI, PERRLA.   ENT: Neck supple. Airway patent.  RESP: CTA b/l, no wheezes, rales, or rhonchi.   CARDIAC: RRR, clear distinct S1, S2, no appreciable murmurs.  ABD: Soft, non-tender.  MSK: RAMIREZ. FROM throughout.  NEURO: No focal motor or sensory deficits. No facial asymmetry. Strength 5/5 throughout. No drift. LUE dysmetria finger to nose. Taking small steps w/ mild ataxia.  VASC: Strong distal pulses. No edema. No calf tenderness.  SKIN: No rashes or lesions.

## 2023-08-28 DIAGNOSIS — E78.5 HYPERLIPIDEMIA, UNSPECIFIED: ICD-10-CM

## 2023-08-28 DIAGNOSIS — I63.9 CEREBRAL INFARCTION, UNSPECIFIED: ICD-10-CM

## 2023-08-28 LAB
ANION GAP SERPL CALC-SCNC: 12 MMOL/L — SIGNIFICANT CHANGE UP (ref 5–17)
APTT BLD: 26.4 SEC — SIGNIFICANT CHANGE UP (ref 24.5–35.6)
BUN SERPL-MCNC: 12 MG/DL — SIGNIFICANT CHANGE UP (ref 7–23)
CALCIUM SERPL-MCNC: 9.1 MG/DL — SIGNIFICANT CHANGE UP (ref 8.4–10.5)
CHLORIDE SERPL-SCNC: 106 MMOL/L — SIGNIFICANT CHANGE UP (ref 96–108)
CO2 SERPL-SCNC: 25 MMOL/L — SIGNIFICANT CHANGE UP (ref 22–31)
CREAT SERPL-MCNC: 1.01 MG/DL — SIGNIFICANT CHANGE UP (ref 0.5–1.3)
DRVVT RATIO: 0.93 RATIO — SIGNIFICANT CHANGE UP (ref 0–1.21)
DRVVT SCREEN TO CONFIRM RATIO: SIGNIFICANT CHANGE UP
EGFR: 86 ML/MIN/1.73M2 — SIGNIFICANT CHANGE UP
GLUCOSE SERPL-MCNC: 87 MG/DL — SIGNIFICANT CHANGE UP (ref 70–99)
HCT VFR BLD CALC: 40.5 % — SIGNIFICANT CHANGE UP (ref 39–50)
HCV AB S/CO SERPL IA: 0.06 S/CO — SIGNIFICANT CHANGE UP (ref 0–0.99)
HCV AB SERPL-IMP: SIGNIFICANT CHANGE UP
HGB BLD-MCNC: 13.1 G/DL — SIGNIFICANT CHANGE UP (ref 13–17)
INR BLD: 1.01 RATIO — SIGNIFICANT CHANGE UP (ref 0.85–1.18)
MAGNESIUM SERPL-MCNC: 2.3 MG/DL — SIGNIFICANT CHANGE UP (ref 1.6–2.6)
MCHC RBC-ENTMCNC: 31.3 PG — SIGNIFICANT CHANGE UP (ref 27–34)
MCHC RBC-ENTMCNC: 32.3 GM/DL — SIGNIFICANT CHANGE UP (ref 32–36)
MCV RBC AUTO: 96.7 FL — SIGNIFICANT CHANGE UP (ref 80–100)
NORMALIZED SCT PPP-RTO: 0.9 RATIO — SIGNIFICANT CHANGE UP (ref 0–1.16)
NORMALIZED SCT PPP-RTO: SIGNIFICANT CHANGE UP
NRBC # BLD: 0 /100 WBCS — SIGNIFICANT CHANGE UP (ref 0–0)
PHOSPHATE SERPL-MCNC: 2.5 MG/DL — SIGNIFICANT CHANGE UP (ref 2.5–4.5)
PLATELET # BLD AUTO: 251 K/UL — SIGNIFICANT CHANGE UP (ref 150–400)
POTASSIUM SERPL-MCNC: 4 MMOL/L — SIGNIFICANT CHANGE UP (ref 3.5–5.3)
POTASSIUM SERPL-SCNC: 4 MMOL/L — SIGNIFICANT CHANGE UP (ref 3.5–5.3)
PROTHROM AB SERPL-ACNC: 10.6 SEC — SIGNIFICANT CHANGE UP (ref 9.5–13)
RBC # BLD: 4.19 M/UL — LOW (ref 4.2–5.8)
RBC # FLD: 11.9 % — SIGNIFICANT CHANGE UP (ref 10.3–14.5)
SODIUM SERPL-SCNC: 143 MMOL/L — SIGNIFICANT CHANGE UP (ref 135–145)
WBC # BLD: 7.77 K/UL — SIGNIFICANT CHANGE UP (ref 3.8–10.5)
WBC # FLD AUTO: 7.77 K/UL — SIGNIFICANT CHANGE UP (ref 3.8–10.5)

## 2023-08-28 PROCEDURE — 93306 TTE W/DOPPLER COMPLETE: CPT | Mod: 26

## 2023-08-28 PROCEDURE — 99223 1ST HOSP IP/OBS HIGH 75: CPT

## 2023-08-28 RX ORDER — SODIUM CHLORIDE 9 MG/ML
1000 INJECTION INTRAMUSCULAR; INTRAVENOUS; SUBCUTANEOUS
Refills: 0 | Status: DISCONTINUED | OUTPATIENT
Start: 2023-08-28 | End: 2023-08-29

## 2023-08-28 RX ADMIN — Medication 650 MILLIGRAM(S): at 20:45

## 2023-08-28 RX ADMIN — ATORVASTATIN CALCIUM 80 MILLIGRAM(S): 80 TABLET, FILM COATED ORAL at 21:00

## 2023-08-28 RX ADMIN — ENOXAPARIN SODIUM 40 MILLIGRAM(S): 100 INJECTION SUBCUTANEOUS at 15:37

## 2023-08-28 RX ADMIN — CLOPIDOGREL BISULFATE 75 MILLIGRAM(S): 75 TABLET, FILM COATED ORAL at 15:36

## 2023-08-28 RX ADMIN — Medication 650 MILLIGRAM(S): at 05:00

## 2023-08-28 RX ADMIN — SODIUM CHLORIDE 65 MILLILITER(S): 9 INJECTION INTRAMUSCULAR; INTRAVENOUS; SUBCUTANEOUS at 03:00

## 2023-08-28 RX ADMIN — Medication 650 MILLIGRAM(S): at 20:16

## 2023-08-28 RX ADMIN — Medication 81 MILLIGRAM(S): at 15:37

## 2023-08-28 RX ADMIN — Medication 650 MILLIGRAM(S): at 04:10

## 2023-08-28 NOTE — DISCHARGE NOTE PROVIDER - NSDCCPCAREPLAN_GEN_ALL_CORE_FT
PRINCIPAL DISCHARGE DIAGNOSIS  Diagnosis: Stroke  Assessment and Plan of Treatment: Please follow up with neurologist in 1 week. Continue taking medications as prescribed. Monitor your blood pressure. Reduce fat, cholesterol and salt in your diet. Increase intake of fruits and vegetables. Limit alcohol to minimum and do not smoke. You may be at risk for falling, make changes to your home to help you walk easier. Keep up to date on vaccinations.  If you experience any symptoms of facial drooping, slurred speech, arm or leg weakness, severe headache, vision changes or any worsening symptoms, notify provider immediatley and return to ER.

## 2023-08-28 NOTE — SPEECH LANGUAGE PATHOLOGY EVALUATION - SLP PERTINENT HISTORY OF CURRENT PROBLEM
58M pmhx  HLD, who p/w acute onset of vertigo with nausea/vomiting and gait instability. LKW 1600 on 8/26/2023. Pt was sitting down watching TV when he had acute onset sensation of room spinning which was mildly palliated by putting his head down. He had accompanying nausea and vomiting and gait imbalance,. At baseline pt has no issues ambulating. Pt also reported transient numbness/tingling in b/l finger tips (x10 digits) that resolved after a few hours. He noted brief binocular blurry vision without diplopia  which resolved soon after onset. Reports he briefly had a raspy voice last evening which has since resolved completely. Pt reports abdominal discomfort accompanying his other symptoms. He reports he is up to date on cancer screening with reportedly normal coloscopy, EGD as well as normal stress test, ecg, and echo,

## 2023-08-28 NOTE — SPEECH LANGUAGE PATHOLOGY EVALUATION - SLP GENERAL OBSERVATIONS
Patient received OOB in chair, on room air, A&Ox4. Patient demonstrated good insight to hospitalization and deemed a reliant provider of past/current medical history.

## 2023-08-28 NOTE — DISCHARGE NOTE PROVIDER - NSDCMRMEDTOKEN_GEN_ALL_CORE_FT
simvastatin 40 mg oral tablet: 1 orally   aspirin 81 mg oral delayed release tablet: 1 tab(s) orally once a day  atorvastatin 80 mg oral tablet: 1 tab(s) orally once a day (at bedtime)  clopidogrel 75 mg oral tablet: 1 tab(s) orally once a day  famotidine 40 mg oral tablet: 1 tab(s) orally once a day as needed for upset stomach

## 2023-08-28 NOTE — OCCUPATIONAL THERAPY INITIAL EVALUATION ADULT - LIVES WITH, PROFILE
Pt lives in apartment with 2/3 steps to enter, +elevator, walk in shower. Pt I in ADLs and ambulation prior to admission

## 2023-08-28 NOTE — CONSULT NOTE ADULT - PROBLEM SELECTOR RECOMMENDATION 9
MRI H - acute infarct in the left cerebellar hemisphere  DAPT/Statin  check ECHO  monitor on tele - possible ILR as per stroke recs  management as per stroke team

## 2023-08-28 NOTE — CHART NOTE - NSCHARTNOTEFT_GEN_A_CORE
***Vascular Neurology Follow-up Chart Note***    Patient seen today with attending on rounds. Please also refer to attending addendum on initial H&P dated 8/27    Exam stable except: __      Impression: Vertigo, dysmetria, ataxia due to cerebellar dysfunction due to acute ischemic stroke in cerebellum likely due to small vessel disease      Plan:    [] ANTITHROMBOTIC THERAPY: ASA 81 and Plavix 75. DVT PPX with Lovenox 40 qd  [x] MRI - acute infarct in the left cerebellar hemisphere in between the border zone of the left PICA and AICA  [x] Vessel imaging - CTA HEAD: No flow-limiting stenosis or occlusion. 2 mm anterior communicating artery aneurysm.  [] TTE                   [x] A1C (5.0), LDL (115)  [x] Atorvastatin 80   [] Physical therapy/OT/Speech Language    SBP goal: Gradual normotension     Case & Plan discussed with patient and family. All questions answered. Plan discussed with primary team       CORE MEASURES:         Admission NIHSS: 1 (LUE Dysmetria)     ICH score: N/A     Hunt and Mckeon Score: N/A      Tenecteplase: [] YES [x] NO     Statin Therapy: [x] YES [] NO     Smoking [] YES [x] NO     Afib [] YES [x] NO     Stroke Education [x] YES [] NO    Dysphagia screen : [x] Passed [] Failed- S&S pending [] Pending  (Ensure medications are ordered via appropriate route)    DVT ppx: Venodynes [] Heparin s.c [] LMWH [x] ***Vascular Neurology Follow-up Chart Note***    Patient seen today with attending on rounds. Please also refer to attending addendum on initial H&P dated 8/27    Exam stable, except no longer has LUE dysmetria.       Impression: Vertigo, dysmetria, ataxia due to cerebellar dysfunction due to acute ischemic stroke in cerebellum likely due to small vessel disease      Plan:    [] ANTITHROMBOTIC THERAPY: ASA 81 and Plavix 75. DVT PPX with Lovenox 40 qd  [x] MRI - acute infarct in the left cerebellar hemisphere in between the border zone of the left PICA and AICA  [x] Vessel imaging - CTA HEAD: No flow-limiting stenosis or occlusion. 2 mm anterior communicating artery aneurysm.  [] TTE                   [x] A1C (5.0), LDL (115)  [x] Atorvastatin 80   [] Physical therapy/OT/Speech Language    SBP goal: Gradual normotension     Case & Plan discussed with patient and family. All questions answered. Plan discussed with primary team       CORE MEASURES:         Admission NIHSS: 1 (LUE Dysmetria)     ICH score: N/A     Hunt and Mckeon Score: N/A      Tenecteplase: [] YES [x] NO     Statin Therapy: [x] YES [] NO     Smoking [] YES [x] NO     Afib [] YES [x] NO     Stroke Education [x] YES [] NO    Dysphagia screen : [x] Passed [] Failed- S&S pending [] Pending  (Ensure medications are ordered via appropriate route)    DVT ppx: Venodynes [] Heparin s.c [] LMWH [x]

## 2023-08-28 NOTE — SPEECH LANGUAGE PATHOLOGY EVALUATION - SLP ORGANIZATION
Mildly reduced organization noted when completing clock-drawing task. Patient organized time into increments of 5-minutes; not hours but set hands to correct time.

## 2023-08-28 NOTE — OCCUPATIONAL THERAPY INITIAL EVALUATION ADULT - PERTINENT HX OF CURRENT PROBLEM, REHAB EVAL
58M pmhx  HLD, who p/w acute onset of vertigo with nausea/vomiting and gait instability. LKW 1600 on 8/26/2023. Pt was sitting down watching TV when he had acute onset sensation of room spinning which was mildly palliated by putting his head down. He had accompanying nausea and vomiting and gait imbalance,. At baseline pt has no issues ambulating. Pt also reported transient numbness/tingling in b/l finger tips (x10 digits) that resolved after a few hours. He noted brief binocular blurry vision without diplopia  which resolved soon after onset. Reports he briefly had a raspy voice last evening which has since resolved completely. Pt reports abdominal discomfort accompanying his other symptoms. He reports he is up to date on cancer screening with reportedly normal clonoscopy, EGD as well as normal stress test, ecg, and echo,Pt has never had vertigo before.  Pt denies weakness, sensory changes, double vision, hearing loss, or tinnitus. No AC/AP. Pt reports symptoms have improved since onset and continued to improve while in hospital. Denies recent infection, fevers, chills, cough, sob, abd pain, constipation, diarrhea, dysuria.    MRI Head: Acute left cerebellar infarct.  CT HEAD: Negative noncontrast head CT.  CTA HEAD: No flow-limiting stenosis or occlusion. 2 mm anterior communicating artery aneurysm.  CTA NECK: No flow-limiting stenosis, occlusion or dissection.

## 2023-08-28 NOTE — SPEECH LANGUAGE PATHOLOGY EVALUATION - COMMENTS
JIMW 5 PM 8/26/2023  NIHSS 1  preMRS 0  Pt not IV tenecteplase candidate because outside of time window  Pt not thrombectomy candidate because no LVO    Neuro Impression: Vertigo likely peripheral (BPPV). However, given history LUE dysmetria, vascular comorbidities, and pt still having difficulty walking (no walking issues at baseline), would be prudent to r/o ischemic stroke of brainstem/cerebellum that could cause vertigo.  CT HEAD(8/26): Negative noncontrast head CT.  CTA HEAD(8/26):  No flow-limiting stenosis or occlusion. 2 mm anterior communicating artery aneurysm.  CTA NECK(8/26): No flow-limiting stenosis, occlusion or dissection.  MRI (8/27): Acute left cerebellar infarct. JIMW 5 PM 8/26/2023  NIHSS 1  preMRS 0  Pt not IV tenecteplase candidate because outside of time window  Pt not thrombectomy candidate because no LVO    Neuro Impression: Vertigo likely peripheral (BPPV). However, given history LUE dysmetria, vascular comorbidities, and pt still having difficulty walking (no walking issues at baseline), would be prudent to r/o ischemic stroke of brainstem/cerebellum that could cause vertigo.  CT HEAD(8/26): Negative noncontrast head CT.  CTA HEAD(8/26):  No flow-limiting stenosis or occlusion. 2 mm anterior communicating artery aneurysm.  CTA NECK(8/26): No flow-limiting stenosis, occlusion or dissection.  MRI (8/27): Acute left cerebellar infarct.    Patient passed  dysphagia screen on 8/27/23 at 14:58. Discussed with MELONY Waters and MERCY Reyes

## 2023-08-28 NOTE — PHYSICAL THERAPY INITIAL EVALUATION ADULT - NSPTDISCHREC_GEN_A_CORE
Patient is cleared for D/C home from physical therapy standpoint. Patient is agreeable, MELONY Waters and RONNIE Lewis aware./No skilled PT needs

## 2023-08-28 NOTE — CONSULT NOTE ADULT - ASSESSMENT
Patient is a 58 year old male with a PMHx of HLD, who presented to [facility] with a chief complaint of acute onset of vertigo accompanied with nausea/vomiting and gait instability. Patient reports that his last known normal / baseline was on 08/26/2023. Patient reports that he was sitting down, watching TV when he had experienced acute onset sensation of room spinning associated with putting his head down per patient. Patient reports that he had accompanying nausea, vomiting and gait imbalance. Patient reports that at baseline he has no issues with ambulation. Patient also reported transient numbness/tingling in his bilateral finger tips that has resolved.  He noted brief binocular blurry vision without diplopia  which resolved soon after onset. Reports he briefly had a raspy voice which has since resolved completely. Pt reports abdominal discomfort accompanying his other symptoms. Patient reports that his last echocardiogram was 4 years ago. Internal Medicine  has been consulted on Mr. Riley' care for medical management. Patient denies any dizziness, gait unsteadiness, nausea or vomiting. Patient reports feeling better. Denies any history of DVT, PE, Arrythmia in the past. Patient reports that his symptoms have improved since his arrival.       CVA  - CT with 2mm anterior communicating artery aneurysm   - MR with Acute L cerebellar infarct   - DAPT with ASA and Statin   - Lipitor 80   - TTE pending  - Consider CT C/A/P to R/O Malignancy   - Consider ILR to R/O Arrythmia   - Neuro checks per protocol   - Monitor on tele  - PT / OT   - Fall, Seizure and Aspiration precautions  - Care per Stroke Neurology appreciated    Vertigo  - Reports resolution of his symptoms  - Meclizine PRN  - ENT eval PRN    HLD   - LDL of 115; C/w Lipitor 80 for statin therapy  - Diet and Lifestyle modifications       PPX    Discussed with Attending.

## 2023-08-28 NOTE — DISCHARGE NOTE PROVIDER - NSDCPNSUBOBJ_GEN_ALL_CORE
Physical Examination:   Subjective: no acute events overnight. No new neurological complaints     Physical Examination:  Mental status - Awake, Alert, Oriented to person, place, and time. Speech fluent, repetition and naming intact. Follows simple and complex commands.  Cranial nerves - PERRLA (4mm -> 3mm b/l), RHH, EOMI, V1-V3 intact b/l, no facial asymmetry  Motor - Normal bulk and tone throughout. No pronator drift. 5/5 strength throughout   Sensation - Light touch intact throughout  Coordination - Finger to Nose intact b/l. No tremors appreciated  Gait and station - Normal casual gait

## 2023-08-28 NOTE — PHYSICAL THERAPY INITIAL EVALUATION ADULT - ADDITIONAL COMMENTS
Pt reports he lives with his wife and son in a apt with 2-3 steps to enter, +HR and +elevator inside. Pt reports his family is available to assist when needed. +eyeglasses.

## 2023-08-28 NOTE — SPEECH LANGUAGE PATHOLOGY EVALUATION - SLP DIAGNOSIS
58Y M pmhx HLD, who p/w acute onset of vertigo with nausea/vomiting and gait instability. Further imaging revealed acute left cerebellar infarct. Speech and language evaluation completed. Patient presenting with overtly functional expressive and receptive skills with mild executive function deficits. Patient endorses that he briefly had a raspy voice which has since resolved. Patient verbose at times, benefiting from verbal and tactile cues for redirection. Reduced organization and self-monitoring noted upon completion of clock drawing task. Patient demonstrates good insight and identified errors and verbalized correction.

## 2023-08-28 NOTE — CONSULT NOTE ADULT - SUBJECTIVE AND OBJECTIVE BOX
CHIEF COMPLAINT:  Stroke     HISTORY OF PRESENT ILLNESS:  MIGUEL GUTIERREZ is a 58M pmhx  HLD, who p/w acute onset of vertigo with nausea/vomiting and gait instability. LKW 1600 on 8/26/2023. Pt was sitting down watching TV when he had acute onset sensation of room spinning which was mildly palliated by putting his head down. He had accompanying nausea and vomiting and gait imbalance,. At baseline pt has no issues ambulating. Pt also reported transient numbness/tingling in b/l finger tips (x10 digits) that resolved after a few hours. He noted brief binocular blurry vision without diplopia  which resolved soon after onset. Reports he briefly had a raspy voice last evening which has since resolved completely. Pt reports abdominal discomfort accompanying his other symptoms. He reports he is up to date on cancer screening with reportedly normal clonoscopy, EGD as well as normal stress test, ecg, and echo,    Pt has never had vertigo before.  Pt denies weakness, sensory changes, double vision, hearing loss, or tinnitus.No AC/AP. Pt reports symptoms have improved since onset and continued to improve while in hospital. Denies recent infection, fevers, chills, cough, sob, abd pain, constipation, diarrhea, dysuria.    Cardiology consulted for cardiac management.  Pt did see a cardiologist 5 years ago, had echo and stress test all normal.  Denies chest pain, SOB, palpitations.     PAST MEDICAL & SURGICAL HISTORY:  HLD (hyperlipidemia)    No significant past surgical history    MEDICATIONS:  aspirin enteric coated 81 milliGRAM(s) Oral daily  clopidogrel Tablet 75 milliGRAM(s) Oral daily  enoxaparin Injectable 40 milliGRAM(s) SubCutaneous every 24 hours    acetaminophen     Tablet .. 650 milliGRAM(s) Oral every 6 hours PRN  meclizine 25 milliGRAM(s) Oral every 6 hours PRN    famotidine    Tablet 40 milliGRAM(s) Oral every 12 hours PRN    atorvastatin 80 milliGRAM(s) Oral at bedtime    sodium chloride 0.9%. 1000 milliLiter(s) IV Continuous <Continuous>    FAMILY HISTORY:  No pertinent family history in first degree relatives    SOCIAL HISTORY:    [ ] Non-smoker  [ ] Smoker  [ ] Alcohol    Allergies    No Known Allergies    Intolerances    REVIEW OF SYSTEMS:  CONSTITUTIONAL: No fever, weight loss, + fatigue  EYES: No eye pain, visual disturbances, or discharge  ENMT:  No difficulty hearing, tinnitus, vertigo; No sinus or throat pain  NECK: No pain or stiffness  RESPIRATORY: No cough, wheezing, chills or hemoptysis; No Shortness of Breath  CARDIOVASCULAR: No chest pain, palpitations, passing out, dizziness, or leg swelling  GASTROINTESTINAL: No abdominal or epigastric pain. No nausea, vomiting, or hematemesis; No diarrhea or constipation. No melena or hematochezia.  GENITOURINARY: No dysuria, frequency, hematuria, or incontinence  NEUROLOGICAL: No headaches, memory loss, loss of strength, numbness, or tremors  SKIN: No itching, burning, rashes, or lesions   LYMPH Nodes: No enlarged glands  ENDOCRINE: No heat or cold intolerance; No hair loss  MUSCULOSKELETAL: No joint pain or swelling; No muscle, back, or extremity pain  PSYCHIATRIC: No depression, anxiety, mood swings, or difficulty sleeping  HEME/LYMPH: No easy bruising, or bleeding gums  ALLERY AND IMMUNOLOGIC: No hives or eczema	    [ ] All others negative	  [ ] Unable to obtain    PHYSICAL EXAM:  T(C): 36.7 (08-28-23 @ 04:00), Max: 37.3 (08-27-23 @ 20:00)  HR: 88 (08-28-23 @ 09:55) (52 - 88)  BP: 152/80 (08-28-23 @ 09:55) (104/73 - 152/80)  RR: 17 (08-28-23 @ 09:55) (14 - 21)  SpO2: 98% (08-28-23 @ 09:55) (95% - 99%)  Wt(kg): --  I&O's Summary    27 Aug 2023 07:01  -  28 Aug 2023 07:00  --------------------------------------------------------  IN: 380 mL / OUT: 700 mL / NET: -320 mL    Appearance: Normal	  HEENT:   Normal oral mucosa, PERRL, EOMI	  Lymphatic: No lymphadenopathy  Cardiovascular: Normal S1 S2, No JVD, No murmurs, No edema  Respiratory: Lungs clear to auscultation	  Gastrointestinal:  Soft, Non-tender, + BS	  Skin: No rashes, No ecchymoses, No cyanosis	  - Mental Status: Alert, awake, oriented to person, age, place, and time; speech is fluent with intact naming, repetition, and follows 1-step and 3-step mid-line crossing commands; good overall fund of knowledge (aware of current events, relevant past history, and vocabulary appropriate for level of education);   - Cranial Nerves: visual fields are full to confrontation; pupils are equal, round, and reactive to light; extraocular movements are intact with bilateral end gaze correctioanl nystagmus; facial sensation is intact in the V1-V3 distribution bilaterally; face is symmetric with normal eye closure and smile; hearing is intact to conversation; uvula is midline and soft palate rises symmetrically; shoulder shrug intact; tongue protrudes in the midline.  - Pt denied vertigo at onset of exam, after gait exam pt endorsed vertigo and HINTS exam performed.  HI: prolonged slow phase then normal corrective saccade bilateraly  N: Correctional nystagmus on b/l end gaze  S: no skew appreciated   - Motor/Strength Testing:                                 Right           Left  Deltoid                     5                 5  Biceps                      5                 5  Triceps                     5                 5  Wrist Ext (radial)       5                 5  Hand                  5                 5  Hip Flex                   5                  5  Knee Ext	      5                  5  Dorsiflex                  5                  5  Plantarflex               5                  5  - There is no pronator drift. Normal muscle bulk and tone throughout.  - Reflexes:   Bicep (C5/C6):                  R 2+ --- L 2+   Brachioradialis (C5/C6) :   R 2+ --- L 2+   Patella (L3/L4) :                 R 2+ --- L 2+   Ankle (S1) :                       R 2+ --- L 2+   - Plant responses mute bilaterally.  - Sensory: Intact throughout to light touch.   - Coordination: Finger-nose-finger intact and Heel-shin without dysmetria on RIGHT. on the LEFT there is very slight dysmetria to both FNF and HS. In comparison with prior documentation and verbal sign out from overnight team, this represents an improvement. The patient also notes significant improvement on these tests and his gait.  - Gait: Short steps, ataxic gait. Able to toe, heel, and tandem walk. Romberg negative  Vascular: Peripheral pulses palpable 2+ bilaterally    TELEMETRY: 	    ECG:  	  RADIOLOGY: < from: MR Head No Cont (08.27.23 @ 11:22) >  ACC: [id number] EXAM:  MR BRAIN   ORDERED BY: ASHLYN BROWN     PROCEDURE DATE:  08/27/2023          INTERPRETATION:  CLINICAL INDICATION: Dizziness      Magnetic resonance imaging of the brain was carried out with transaxial   SPGR, FLAIR, fast spin echo T2 weighted images, axial susceptibility   weighted series, diffusion weighted series and sagittal T1 weighted   series on a 1.5 Adelina magnet.    Comparison is made with the prior CT/CTA of 8/26/2023.    The fourth, third and lateral ventriclesare normal in size and position.   There is no hemorrhage, mass or shift of the midline structures. There is   an acute infarct in the left cerebellar hemisphere in between the border   zone of the left posterior inferior and anterior inferior cerebellar   arteries. No acute hemorrhage is identified.      The sellar and parasellar structures are unremarkable. The paranasal   sinuses are clear.    IMPRESSION: Acute left cerebellar infarct.    --- End of Report ---      < end of copied text >    OTHER: 	  	  LABS:	 	    CARDIAC MARKERS:                        13.1   7.77  )-----------( 251      ( 28 Aug 2023 07:29 )             40.5     08-28    143  |  106  |  12  ----------------------------<  87  4.0   |  25  |  1.01    Ca    9.1      28 Aug 2023 07:25  Phos  2.5     08-28  Mg     2.3     08-28    TPro  6.4  /  Alb  4.2  /  TBili  0.9  /  DBili  x   /  AST  22  /  ALT  18  /  AlkPhos  60  08-26    proBNP:   Lipid Profile:  Cholesterol: 183: Interpretive Comment: Triglycerides, Serum: 44: Interpretive Comment: HDL Cholesterol: 60: Interpretive Comment: Non HDL Cholesterol: 124: Interpretive Comment: LDL Cholesterol Calculated: 115: Interpretive Comment:   HgA1c:  A1C with Estimated Average Glucose Result: 5.0: Method: Immunoassay   TSH:

## 2023-08-28 NOTE — PHYSICAL THERAPY INITIAL EVALUATION ADULT - PERTINENT HX OF CURRENT PROBLEM, REHAB EVAL
Problem: Chronic Conditions and Co-morbidities  Goal: Patient's chronic conditions and co-morbidity symptoms are monitored and maintained or improved  Outcome: Progressing
Pt is a 58M pmhx  HLD, who p/w acute onset of vertigo with nausea/vomiting and gait instability. LKW 1600 on 8/26/2023. Pt was sitting down watching TV when he had acute onset sensation of room spinning which was mildly palliated by putting his head down. He had accompanying nausea and vomiting and gait imbalance,. At baseline pt has no issues ambulating. Pt also reported transient numbness/tingling in b/l finger tips (x10 digits) that resolved after a few hours. He noted brief binocular blurry vision without diplopia  which resolved soon after onset. Reports he briefly had a raspy voice last evening which has since resolved completely. Pt reports abdominal discomfort accompanying his other symptoms. He reports he is up to date on cancer screening with reportedly normal clonoscopy, EGD as well as normal stress test, ecg, and echo,Pt has never had vertigo before.  Pt denies weakness, sensory changes, double vision, hearing loss, or tinnitus. No AC/AP. Pt reports symptoms have improved since onset and continued to improve while in hospital. Denies recent infection, fevers, chills, cough, sob, abd pain, constipation, diarrhea, dysuria.    MRI Head: Acute left cerebellar infarct.  CT HEAD: Negative noncontrast head CT.  CTA HEAD: No flow-limiting stenosis or occlusion. 2 mm anterior communicating artery aneurysm.  CTA NECK: No flow-limiting stenosis, occlusion or dissection.

## 2023-08-28 NOTE — CONSULT NOTE ADULT - ASSESSMENT
MIGUEL GUTIERREZ is a 58M pmhx  HLD, who p/w acute onset of vertigo with nausea/vomiting and gait instability.

## 2023-08-28 NOTE — DISCHARGE NOTE PROVIDER - HOSPITAL COURSE
59yo male with HLD presented with acute onset of vertigo with nausea/vomiting and gait instability. LKW 1600 on 8/26/2023. Pt was sitting down watching TV when he had acute onset sensation of room spinning which was mildly palliated by putting his head down. He had accompanying nausea and vomiting and gait imbalance. At baseline pt has no issues ambulating. Pt also reported transient numbness/tingling in b/l finger tips (x10 digits) that resolved after a few hours. He noted brief binocular blurry vision without diplopia  which resolved soon after onset. Reports he briefly had a raspy voice last evening which has since resolved completely. He reports he is up to date on cancer screening with reportedly normal colonoscopy, EGD as well as normal stress test, ecg, and echo.    CT Head, CTA Head/Neck 8/27/23:  CT HEAD: Negative noncontrast head CT.    CTA HEAD:  No flow-limiting stenosis or occlusion. 2 mm anterior communicating   artery aneurysm.    CTA NECK:  No flow-limiting stenosis, occlusion or dissection.    MR Brain 8/27/23:  Acute left cerebellar infarct.   57yo male with HLD presented with acute onset of vertigo with nausea/vomiting and gait instability. LKW 1600 on 8/26/2023. Pt was sitting down watching TV when he had acute onset sensation of room spinning which was mildly palliated by putting his head down. He had accompanying nausea and vomiting and gait imbalance. At baseline pt has no issues ambulating. Pt also reported transient numbness/tingling in b/l finger tips (x10 digits) that resolved after a few hours. He noted brief binocular blurry vision without diplopia  which resolved soon after onset. Reports he briefly had a raspy voice last evening which has since resolved completely. He reports he is up to date on cancer screening with reportedly normal colonoscopy, EGD as well as normal stress test, ecg, and echo.    Pt was admitted to the Neurology service on 8/27/23. MRI was performed and demonstrated an acute left cerebellar infarct. Pt was therefore started on Aspirin and Plavix per [facility] protocol. Pt will be discharged on a high dose statin. PT/OT evaluated pt and recommended home with no needs. Pt seen and examined by the Neurology team and deemed stable for discharge. All medications were reviewed and questions/concerns were addressed. Pt was instructed to follow up with PCP, Cardiologist, and Neurologist.       CT Head, CTA Head/Neck 8/27/23:  CT HEAD: Negative noncontrast head CT.    CTA HEAD:  No flow-limiting stenosis or occlusion. 2 mm anterior communicating   artery aneurysm.    CTA NECK:  No flow-limiting stenosis, occlusion or dissection.    MR Brain 8/27/23:  Acute left cerebellar infarct.    Echocardiogram 8/28/23:  1. Normal left ventricular cavity size. Left ventricular wall thickness is normal. Left ventricular systolic function is normal with an ejection fraction of 52 % by Cho's method of disks. No evidence of a thrombus in the left ventricle.   2. Normal right ventricular cavity size, normal right ventricular wall thickness and normal right ventricular systolic function. The tricuspid annular plane systolic excursion (TAPSE) is 3.2 cm (normal >=1.7 cm).   3. No pericardial effusion seen.   4. No prior echocardiogram is available for comparison.     59yo male with HLD presented with acute onset of vertigo with nausea/vomiting and gait instability. LKW 1600 on 8/26/2023. Pt was sitting down watching TV when he had acute onset sensation of room spinning which was mildly palliated by putting his head down. He had accompanying nausea and vomiting and gait imbalance. At baseline pt has no issues ambulating. Pt also reported transient numbness/tingling in b/l finger tips (x10 digits) that resolved after a few hours. He noted brief binocular blurry vision without diplopia  which resolved soon after onset. Reports he briefly had a raspy voice last evening which has since resolved completely. He reports he is up to date on cancer screening with reportedly normal colonoscopy, EGD as well as normal stress test, ecg, and echo.    Pt was admitted to the Neurology service on 8/27/23. MRI was performed and demonstrated an acute left cerebellar infarct. Pt was recommended for an ILR given possible cardioembolic cause, however patient refuses placement of loop recorder. Was recommended to follow up as outpatient for CT C/A/P to rule out malignancy when following up with clinic. Pt was started on Aspirin and Plavix per [facility] protocol. Pt will be discharged on a high dose statin. PT/OT evaluated pt and recommended home with no needs. Pt seen and examined by the Neurology team and deemed stable for discharge. All medications were reviewed and questions/concerns were addressed. Pt was instructed to follow up with PCP, Cardiologist, and Neurologist.         CT Head, CTA Head/Neck 8/27/23:  CT HEAD: Negative noncontrast head CT.    CTA HEAD:  No flow-limiting stenosis or occlusion. 2 mm anterior communicating   artery aneurysm.    CTA NECK:  No flow-limiting stenosis, occlusion or dissection.    MR Brain 8/27/23:  Acute left cerebellar infarct.    Echocardiogram 8/28/23:  1. Normal left ventricular cavity size. Left ventricular wall thickness is normal. Left ventricular systolic function is normal with an ejection fraction of 52 % by Cho's method of disks. No evidence of a thrombus in the left ventricle.   2. Normal right ventricular cavity size, normal right ventricular wall thickness and normal right ventricular systolic function. The tricuspid annular plane systolic excursion (TAPSE) is 3.2 cm (normal >=1.7 cm).   3. No pericardial effusion seen.   4. No prior echocardiogram is available for comparison.

## 2023-08-28 NOTE — DISCHARGE NOTE PROVIDER - CARE PROVIDER_API CALL
Baljinder Rodríguez  Neurology  [address], [address]  [place] [address]  Phone: [phone]  Fax: [phone]  Follow Up Time: Routine

## 2023-08-28 NOTE — SPEECH LANGUAGE PATHOLOGY EVALUATION - SLP ATTENTION
Mildly reduced. Patient noted to be tangential at times benefiting from verbal and visual redirection

## 2023-08-29 VITALS
TEMPERATURE: 99 F | HEART RATE: 78 BPM | DIASTOLIC BLOOD PRESSURE: 93 MMHG | SYSTOLIC BLOOD PRESSURE: 125 MMHG | RESPIRATION RATE: 16 BRPM | OXYGEN SATURATION: 94 %

## 2023-08-29 LAB
ANION GAP SERPL CALC-SCNC: 13 MMOL/L — SIGNIFICANT CHANGE UP (ref 5–17)
B2 GLYCOPROT1 AB SER QL: NEGATIVE — SIGNIFICANT CHANGE UP
BUN SERPL-MCNC: 15 MG/DL — SIGNIFICANT CHANGE UP (ref 7–23)
CALCIUM SERPL-MCNC: 9.3 MG/DL — SIGNIFICANT CHANGE UP (ref 8.4–10.5)
CARDIOLIPIN AB SER-ACNC: NEGATIVE — SIGNIFICANT CHANGE UP
CHLORIDE SERPL-SCNC: 104 MMOL/L — SIGNIFICANT CHANGE UP (ref 96–108)
CO2 SERPL-SCNC: 22 MMOL/L — SIGNIFICANT CHANGE UP (ref 22–31)
CREAT SERPL-MCNC: 0.86 MG/DL — SIGNIFICANT CHANGE UP (ref 0.5–1.3)
EGFR: 100 ML/MIN/1.73M2 — SIGNIFICANT CHANGE UP
GLUCOSE SERPL-MCNC: 101 MG/DL — HIGH (ref 70–99)
HCT VFR BLD CALC: 41.7 % — SIGNIFICANT CHANGE UP (ref 39–50)
HGB BLD-MCNC: 13.9 G/DL — SIGNIFICANT CHANGE UP (ref 13–17)
MCHC RBC-ENTMCNC: 31.2 PG — SIGNIFICANT CHANGE UP (ref 27–34)
MCHC RBC-ENTMCNC: 33.3 GM/DL — SIGNIFICANT CHANGE UP (ref 32–36)
MCV RBC AUTO: 93.7 FL — SIGNIFICANT CHANGE UP (ref 80–100)
NRBC # BLD: 0 /100 WBCS — SIGNIFICANT CHANGE UP (ref 0–0)
PLATELET # BLD AUTO: 254 K/UL — SIGNIFICANT CHANGE UP (ref 150–400)
POTASSIUM SERPL-MCNC: 3.9 MMOL/L — SIGNIFICANT CHANGE UP (ref 3.5–5.3)
POTASSIUM SERPL-SCNC: 3.9 MMOL/L — SIGNIFICANT CHANGE UP (ref 3.5–5.3)
RBC # BLD: 4.45 M/UL — SIGNIFICANT CHANGE UP (ref 4.2–5.8)
RBC # FLD: 11.5 % — SIGNIFICANT CHANGE UP (ref 10.3–14.5)
SODIUM SERPL-SCNC: 139 MMOL/L — SIGNIFICANT CHANGE UP (ref 135–145)
WBC # BLD: 8.16 K/UL — SIGNIFICANT CHANGE UP (ref 3.8–10.5)
WBC # FLD AUTO: 8.16 K/UL — SIGNIFICANT CHANGE UP (ref 3.8–10.5)

## 2023-08-29 PROCEDURE — 85025 COMPLETE CBC W/AUTO DIFF WBC: CPT

## 2023-08-29 PROCEDURE — 93005 ELECTROCARDIOGRAM TRACING: CPT

## 2023-08-29 PROCEDURE — 80307 DRUG TEST PRSMV CHEM ANLYZR: CPT

## 2023-08-29 PROCEDURE — 86146 BETA-2 GLYCOPROTEIN ANTIBODY: CPT

## 2023-08-29 PROCEDURE — 83735 ASSAY OF MAGNESIUM: CPT

## 2023-08-29 PROCEDURE — 84100 ASSAY OF PHOSPHORUS: CPT

## 2023-08-29 PROCEDURE — 96375 TX/PRO/DX INJ NEW DRUG ADDON: CPT

## 2023-08-29 PROCEDURE — 80061 LIPID PANEL: CPT

## 2023-08-29 PROCEDURE — 80053 COMPREHEN METABOLIC PANEL: CPT

## 2023-08-29 PROCEDURE — 83036 HEMOGLOBIN GLYCOSYLATED A1C: CPT

## 2023-08-29 PROCEDURE — 85613 RUSSELL VIPER VENOM DILUTED: CPT

## 2023-08-29 PROCEDURE — 92523 SPEECH SOUND LANG COMPREHEN: CPT

## 2023-08-29 PROCEDURE — G0378: CPT

## 2023-08-29 PROCEDURE — 86147 CARDIOLIPIN ANTIBODY EA IG: CPT

## 2023-08-29 PROCEDURE — 96374 THER/PROPH/DIAG INJ IV PUSH: CPT

## 2023-08-29 PROCEDURE — 70450 CT HEAD/BRAIN W/O DYE: CPT | Mod: MA

## 2023-08-29 PROCEDURE — 97161 PT EVAL LOW COMPLEX 20 MIN: CPT

## 2023-08-29 PROCEDURE — 85610 PROTHROMBIN TIME: CPT

## 2023-08-29 PROCEDURE — 85027 COMPLETE CBC AUTOMATED: CPT

## 2023-08-29 PROCEDURE — 99285 EMERGENCY DEPT VISIT HI MDM: CPT

## 2023-08-29 PROCEDURE — 70551 MRI BRAIN STEM W/O DYE: CPT | Mod: MA

## 2023-08-29 PROCEDURE — 36415 COLL VENOUS BLD VENIPUNCTURE: CPT

## 2023-08-29 PROCEDURE — 70496 CT ANGIOGRAPHY HEAD: CPT | Mod: MA

## 2023-08-29 PROCEDURE — 97165 OT EVAL LOW COMPLEX 30 MIN: CPT

## 2023-08-29 PROCEDURE — 85730 THROMBOPLASTIN TIME PARTIAL: CPT

## 2023-08-29 PROCEDURE — C8929: CPT

## 2023-08-29 PROCEDURE — 70498 CT ANGIOGRAPHY NECK: CPT | Mod: QQ

## 2023-08-29 PROCEDURE — 86803 HEPATITIS C AB TEST: CPT

## 2023-08-29 PROCEDURE — 80048 BASIC METABOLIC PNL TOTAL CA: CPT

## 2023-08-29 RX ORDER — ATORVASTATIN CALCIUM 80 MG/1
1 TABLET, FILM COATED ORAL
Qty: 30 | Refills: 0
Start: 2023-08-29

## 2023-08-29 RX ORDER — FAMOTIDINE 10 MG/ML
1 INJECTION INTRAVENOUS
Qty: 30 | Refills: 0
Start: 2023-08-29

## 2023-08-29 RX ORDER — ASPIRIN/CALCIUM CARB/MAGNESIUM 324 MG
1 TABLET ORAL
Qty: 30 | Refills: 0
Start: 2023-08-29

## 2023-08-29 RX ORDER — SIMVASTATIN 20 MG/1
1 TABLET, FILM COATED ORAL
Refills: 0 | DISCHARGE

## 2023-08-29 RX ADMIN — Medication 81 MILLIGRAM(S): at 12:06

## 2023-08-29 RX ADMIN — CLOPIDOGREL BISULFATE 75 MILLIGRAM(S): 75 TABLET, FILM COATED ORAL at 12:06

## 2023-08-29 RX ADMIN — FAMOTIDINE 40 MILLIGRAM(S): 10 INJECTION INTRAVENOUS at 06:37

## 2023-08-29 NOTE — PROGRESS NOTE ADULT - PROBLEM SELECTOR PLAN 1
MRI H - acute infarct in the left cerebellar hemisphere  DAPT/Statin  ECHO reviewed - normal  monitor on tele - possible ILR as per stroke recs  management as per stroke team

## 2023-08-29 NOTE — DISCHARGE NOTE NURSING/CASE MANAGEMENT/SOCIAL WORK - NSDCPEFALRISK_GEN_ALL_CORE
For information on Fall & Injury Prevention, visit: [email or web address] OR  [email or web address] OR  https://www.cdc.gov/steadi/patient.html

## 2023-08-29 NOTE — PROGRESS NOTE ADULT - SUBJECTIVE AND OBJECTIVE BOX
Subjective: Patient seen and examined. No new events except as noted.   OOB in chair, feels good.  Remains in Stroke Unit.     REVIEW OF SYSTEMS:    CONSTITUTIONAL: + weakness, fevers or chills  EYES/ENT: No visual changes;  No vertigo or throat pain   NECK: No pain or stiffness  RESPIRATORY: No cough, wheezing, hemoptysis; No shortness of breath  CARDIOVASCULAR: No chest pain or palpitations  GASTROINTESTINAL: No abdominal or epigastric pain. No nausea, vomiting, or hematemesis; No diarrhea or constipation. No melena or hematochezia.  GENITOURINARY: No dysuria, frequency or hematuria  NEUROLOGICAL: No numbness or weakness  SKIN: No itching, burning, rashes, or lesions   All other review of systems is negative unless indicated above.    MEDICATIONS:  MEDICATIONS  (STANDING):  aspirin enteric coated 81 milliGRAM(s) Oral daily  atorvastatin 80 milliGRAM(s) Oral at bedtime  clopidogrel Tablet 75 milliGRAM(s) Oral daily  enoxaparin Injectable 40 milliGRAM(s) SubCutaneous every 24 hours  sodium chloride 0.9%. 1000 milliLiter(s) (65 mL/Hr) IV Continuous <Continuous>    PHYSICAL EXAM:  T(C): 37 (08-29-23 @ 10:00), Max: 37 (08-28-23 @ 18:00)  HR: 78 (08-29-23 @ 10:00) (51 - 86)  BP: 125/93 (08-29-23 @ 10:00) (107/76 - 142/79)  RR: 16 (08-29-23 @ 10:00) (13 - 23)  SpO2: 94% (08-29-23 @ 10:00) (94% - 100%)  Wt(kg): --  I&O's Summary    28 Aug 2023 07:01  -  29 Aug 2023 07:00  --------------------------------------------------------  IN: 1560 mL / OUT: 0 mL / NET: 1560 mL    Appearance: Normal	  HEENT:   Normal oral mucosa, PERRL, EOMI	  Lymphatic: No lymphadenopathy , no edema  Cardiovascular: Normal S1 S2, No JVD, No murmurs , Peripheral pulses palpable 2+ bilaterally  Respiratory: Lungs clear to auscultation, normal effort 	  Gastrointestinal:  Soft, Non-tender, + BS	  Skin: No rashes, No ecchymoses, No cyanosis, warm to touch  Musculoskeletal: Normal range of motion, normal strength  Psychiatry:  Mood & affect appropriate  Ext: No edema    LABS:    CARDIAC MARKERS:                        13.9   8.16  )-----------( 254      ( 29 Aug 2023 06:22 )             41.7     08-29    139  |  104  |  15  ----------------------------<  101<H>  3.9   |  22  |  0.86    Ca    9.3      29 Aug 2023 06:22  Phos  2.5     08-28  Mg     2.3     08-28    proBNP:   Lipid Profile:   HgA1c:   TSH:     TELEMETRY: 	    ECG:  	  RADIOLOGY:   DIAGNOSTIC TESTING:  [ ] Echocardiogram:  [ ]  Catheterization:  [ ] Stress Test:    OTHER: 	
Name of Patient : MIGUEL GUTIERREZ  MRN: [id number]  Date of visit: 08-29-23 @ 13:06      Subjective: Patient seen and examined. No new events except as noted.   Patient seen earlier this AM. Sitting up in chair. Denies any new complaints.   Reports feeling well.     REVIEW OF SYSTEMS:    CONSTITUTIONAL: No weakness, fevers or chills  EYES/ENT: No visual changes;  No vertigo or throat pain   NECK: No pain or stiffness  RESPIRATORY: No cough, wheezing, hemoptysis; No shortness of breath  CARDIOVASCULAR: No chest pain or palpitations  GASTROINTESTINAL: No abdominal or epigastric pain. No nausea, vomiting, or hematemesis; No diarrhea or constipation. No melena or hematochezia.  GENITOURINARY: No dysuria, frequency or hematuria  NEUROLOGICAL: No numbness or weakness  SKIN: No itching, burning, rashes, or lesions   All other review of systems is negative unless indicated above.    MEDICATIONS:  MEDICATIONS  (STANDING):  aspirin enteric coated 81 milliGRAM(s) Oral daily  atorvastatin 80 milliGRAM(s) Oral at bedtime  clopidogrel Tablet 75 milliGRAM(s) Oral daily  enoxaparin Injectable 40 milliGRAM(s) SubCutaneous every 24 hours  sodium chloride 0.9%. 1000 milliLiter(s) (65 mL/Hr) IV Continuous <Continuous>      PHYSICAL EXAM:  T(C): 37 (08-29-23 @ 10:00), Max: 37 (08-28-23 @ 18:00)  HR: 78 (08-29-23 @ 10:00) (51 - 86)  BP: 125/93 (08-29-23 @ 10:00) (107/76 - 142/79)  RR: 16 (08-29-23 @ 10:00) (13 - 23)  SpO2: 94% (08-29-23 @ 10:00) (94% - 100%)  Wt(kg): --  I&O's Summary    28 Aug 2023 07:01  -  29 Aug 2023 07:00  --------------------------------------------------------  IN: 1560 mL / OUT: 0 mL / NET: 1560 mL          Appearance: Normal; OOB TC 	  HEENT:  Eyes are open   Lymphatic: No lymphadenopathy   Cardiovascular: Normal   Respiratory: normal effort , clear  Gastrointestinal:  Soft, Non-tender  Skin: No rashes,  warm to touch  Psychiatry:  Mood & affect appropriate  Musculoskeletal: No edema        08-28-23 @ 07:01  -  08-29-23 @ 07:00  --------------------------------------------------------  IN: 1560 mL / OUT: 0 mL / NET: 1560 mL                                  13.9   8.16  )-----------( 254      ( 29 Aug 2023 06:22 )             41.7               08-29    139  |  104  |  15  ----------------------------<  101<H>  3.9   |  22  |  0.86    Ca    9.3      29 Aug 2023 06:22  Phos  2.5     08-28  Mg     2.3     08-28      PT/INR - ( 28 Aug 2023 07:30 )   PT: 10.6 sec;   INR: 1.01 ratio         PTT - ( 28 Aug 2023 07:30 )  PTT:26.4 sec                   Urinalysis Basic - ( 29 Aug 2023 06:22 )    Color: x / Appearance: x / SG: x / pH: x  Gluc: 101 mg/dL / Ketone: x  / Bili: x / Urobili: x   Blood: x / Protein: x / Nitrite: x   Leuk Esterase: x / RBC: x / WBC x   Sq Epi: x / Non Sq Epi: x / Bacteria: x        tt< from: TTE W or WO Ultrasound Enhancing Agent (08.28.23 @ 14:14) >  CONCLUSIONS:      1. Normal left ventricular cavity size. Left ventricular wall thickness is normal. Left ventricular systolic function is normal with an ejection fraction of 52 % by Cho's method of disks. No evidence of a thrombus in the left ventricle.   2. Normal right ventricular cavity size, normal right ventricular wall thickness and normal right ventricular systolic function. The tricuspid annular plane systolic excursion(TAPSE) is 3.2 cm (normal >=1.7 cm).   3. No pericardial effusion seen.   4. No prior echocardiogram is available for comparison.    < end of copied text >

## 2023-08-29 NOTE — DISCHARGE NOTE NURSING/CASE MANAGEMENT/SOCIAL WORK - PATIENT PORTAL LINK FT
You can access the FollowMyHealth Patient Portal offered by [facility] by registering at the following website: [email or web address]. By joining [facility] FollowMyHealth portal, you will also be able to view your health information using other applications (apps) compatible with our system.

## 2023-08-29 NOTE — PROGRESS NOTE ADULT - ASSESSMENT
Patient is a 58 year old male with a PMHx of HLD, who presented to [facility] with a chief complaint of acute onset of vertigo accompanied with nausea/vomiting and gait instability. Patient reports that his last known normal / baseline was on 08/26/2023. Patient reports that he was sitting down, watching TV when he had experienced acute onset sensation of room spinning associated with putting his head down per patient. Patient reports that he had accompanying nausea, vomiting and gait imbalance. Patient reports that at baseline he has no issues with ambulation. Patient also reported transient numbness/tingling in his bilateral finger tips that has resolved.  He noted brief binocular blurry vision without diplopia  which resolved soon after onset. Reports he briefly had a raspy voice which has since resolved completely. Pt reports abdominal discomfort accompanying his other symptoms. Patient reports that his last echocardiogram was 4 years ago. Internal Medicine  has been consulted on Mr. Riley' care for medical management. Patient denies any dizziness, gait unsteadiness, nausea or vomiting. Patient reports feeling better. Denies any history of DVT, PE, Arrythmia in the past. Patient reports that his symptoms have improved since his arrival.       CVA  - CT with 2mm anterior communicating artery aneurysm   - MR with Acute L cerebellar infarct   - DAPT with ASA and Statin   - Lipitor 80   - TTE W/ EF of 52%, neg for thrombus, normal RVSF, Neg for pericardial effusion   - Consider CT C/A/P to R/O Malignancy --> Discussed with patient. He is refusing to have it done inpatient. States that he will follow up with Dr. Martinez, PCP for imaging within 1-2 weeks of discharge. States that he is up to date on his colonoscopy and endoscopy.   - Consider ILR to R/O Arrythmia, defer per neuro   - Neuro checks per protocol   - Monitor on tele  - PT / OT   - Fall, Seizure and Aspiration precautions  - Care per Stroke Neurology appreciated    Vertigo, Nausea   - Reports resolution of his symptoms  - Meclizine PRN  - ENT eval PRN    HLD   - LDL of 115; C/w Lipitor 80 for statin therapy  - Diet and Lifestyle modifications       PPX    Discussed with Attending.   DC planning as per neuro team. 
MIGUEL GUTIERREZ is a 58M pmhx  HLD, who p/w acute onset of vertigo with nausea/vomiting and gait instability.

## 2023-08-30 RX ORDER — CLOPIDOGREL BISULFATE 75 MG/1
1 TABLET, FILM COATED ORAL
Qty: 19 | Refills: 0
Start: 2023-08-30

## 2024-02-15 NOTE — PATIENT PROFILE ADULT - OVER THE PAST TWO WEEKS, HAVE YOU FELT LITTLE INTEREST OR PLEASURE IN DOING THINGS?
Incoming refill request on patient's medication:    No protocol for requested medication.    Medication:     Disp Refills Start End     ARIPiprazole (ABILIFY) 5 MG tablet 30 tablet 0 1/30/2024 --    Sig: GIVE 1/2 (0.5) TABS X 1 WEEK THEN 1 TAB THEREAFTER      Last office visit date: 10/18/23  Pharmacy: [facility]Y #[id number] [address]    Order pended, routed to clinician for review.     Prescriber's Follow Up Recommendation:  6-8 weeks    No Show/Cancels in Last 12 Months: 11/22/23, 1/29/24  Next Visit Date: 5/1/2024         no

## 2025-06-24 NOTE — CONSULT NOTE ADULT - PROBLEM/RECOMMENDATION-2
Primary Defect Width In Cm (Final Defect Size - Required For Flaps/Grafts): 0.8 DISPLAY PLAN FREE TEXT